# Patient Record
Sex: FEMALE | Race: BLACK OR AFRICAN AMERICAN | NOT HISPANIC OR LATINO | Employment: UNEMPLOYED | URBAN - METROPOLITAN AREA
[De-identification: names, ages, dates, MRNs, and addresses within clinical notes are randomized per-mention and may not be internally consistent; named-entity substitution may affect disease eponyms.]

---

## 2016-07-18 LAB
EXTERNAL ABO GROUPING: NORMAL
EXTERNAL ANTIBODY SCREEN: NEGATIVE
EXTERNAL HEPATITIS B SURFACE ANTIGEN: NEGATIVE
EXTERNAL HEPATITIS C AB: NEGATIVE
EXTERNAL RH FACTOR: POSITIVE
EXTERNAL SYPHILIS RPR SCREEN: NORMAL
HIV1 AB SPEC QL IA.RAPID: NEGATIVE

## 2016-11-13 LAB — EXTERNAL GTT 1 HOUR: 131

## 2016-12-13 LAB — EXTERNAL GTT 3 HOURS: NORMAL

## 2017-01-06 ENCOUNTER — ROUTINE PRENATAL (OUTPATIENT)
Dept: OBSTETRICS AND GYNECOLOGY | Facility: CLINIC | Age: 27
End: 2017-01-06

## 2017-01-06 VITALS — BODY MASS INDEX: 39.31 KG/M2 | WEIGHT: 229 LBS | DIASTOLIC BLOOD PRESSURE: 70 MMHG | SYSTOLIC BLOOD PRESSURE: 130 MMHG

## 2017-01-06 DIAGNOSIS — Z34.03 ENCOUNTER FOR SUPERVISION OF NORMAL FIRST PREGNANCY IN THIRD TRIMESTER: Primary | ICD-10-CM

## 2017-01-06 PROCEDURE — 99212 OFFICE O/P EST SF 10 MIN: CPT | Performed by: OBSTETRICS & GYNECOLOGY

## 2017-01-06 NOTE — MR AVS SNAPSHOT
Enrique Moscoso   1/6/2017 9:40 AM   Routine Prenatal    Dept Phone:  [phone]   Encounter #:  [id number]    Provider:  Bhavesh Armendariz MD   Department:  [facility] OBSTETRICS AND GYNECOLOGY                Your Full Care Plan              Your Updated Medication List          This list is accurate as of: 1/6/17 10:30 AM.  Always use your most recent med list.                acetaminophen 325 MG tablet   Commonly known as:  TYLENOL       doxylamine 25 MG tablet   Commonly known as:  UNISOM   Take 1 tablet by mouth at night as needed for sleep or nausea.       folic acid 1 MG tablet   Commonly known as:  FOLVITE   Take 1 tablet by mouth daily.       PRENATAL VIT W/ FE BISG-FA PO       promethazine 25 MG tablet   Commonly known as:  PHENERGAN       terconazole 0.4 % vaginal cream   Commonly known as:  TERAZOL 7   Insert 1 applicator into the vagina Every Night.               You Were Diagnosed With        Codes Comments    Encounter for supervision of normal first pregnancy in third trimester    -  Primary ICD-10-CM: Z34.03  ICD-9-CM: V22.0       Instructions     None    Patient Instructions History      Upcoming Appointments     Visit Type Date Time Department    OB FOLLOWUP 1/6/2017  9:40 AM [facility]      [employer] Signup     Our records indicate that your [facility] [employer] account has been deactivated. If you would like to reactivate your account, please email [email or web address] or call [phone] to talk to our [employer] staff.             Other Info from Your Visit           Allergies     No Known Allergies      Reason for Visit     Routine Prenatal Visit no problem      Vital Signs     Blood Pressure Weight Last Menstrual Period Body Mass Index Smoking Status       130/70 229 lb (104 kg) 05/15/2016 (Approximate) 39.31 kg/m2 Never Smoker       Problems and Diagnoses Noted     Prenatal care    -  Primary

## 2017-01-20 ENCOUNTER — ROUTINE PRENATAL (OUTPATIENT)
Dept: OBSTETRICS AND GYNECOLOGY | Facility: CLINIC | Age: 27
End: 2017-01-20

## 2017-01-20 VITALS — SYSTOLIC BLOOD PRESSURE: 120 MMHG | BODY MASS INDEX: 40.68 KG/M2 | DIASTOLIC BLOOD PRESSURE: 80 MMHG | WEIGHT: 237 LBS

## 2017-01-20 DIAGNOSIS — Z34.03 ENCOUNTER FOR SUPERVISION OF NORMAL FIRST PREGNANCY IN THIRD TRIMESTER: Primary | ICD-10-CM

## 2017-01-20 DIAGNOSIS — O36.63X1 LARGE FOR DATES AFFECTING MANAGEMENT OF MOTHER, THIRD TRIMESTER, FETUS 1: ICD-10-CM

## 2017-01-20 PROCEDURE — 99213 OFFICE O/P EST LOW 20 MIN: CPT | Performed by: OBSTETRICS & GYNECOLOGY

## 2017-01-20 NOTE — MR AVS SNAPSHOT
Enrique Moscoso   2017 9:30 AM   Routine Prenatal    Dept Phone:  [phone]   Encounter #:  [id number]    Provider:  Bhavesh Armendariz MD   Department:  [facility] OBSTETRICS AND GYNECOLOGY                Your Full Care Plan              Today's Medication Changes          These changes are accurate as of: 17  9:59 AM.  If you have any questions, ask your nurse or doctor.               Stop taking medication(s)listed here:     doxylamine 25 MG tablet   Commonly known as:  UNISOM           terconazole 0.4 % vaginal cream   Commonly known as:  TERAZOL 7                      Your Updated Medication List          This list is accurate as of: 17  9:59 AM.  Always use your most recent med list.                acetaminophen 325 MG tablet   Commonly known as:  TYLENOL       folic acid 1 MG tablet   Commonly known as:  FOLVITE   Take 1 tablet by mouth daily.       PRENATAL VIT W/ FE BISG-FA PO       promethazine 25 MG tablet   Commonly known as:  PHENERGAN               You Were Diagnosed With        Codes Comments    Encounter for supervision of normal first pregnancy in third trimester    -  Primary ICD-10-CM: Z34.03  ICD-9-CM: V22.0     Large for dates affecting management of mother, third trimester, fetus 1     ICD-10-CM: O36.63X1  ICD-9-CM: 656.63       Instructions     None    Patient Instructions History      Upcoming Appointments     Visit Type Date Time Department    OB FOLLOWUP 2017  9:30 AM [facility] [place]      [employer] Signup     [facility] [employer] allows you to send messages to your doctor, view your test results, renew your prescriptions, schedule appointments, and more. To sign up, go to [employer] and click on the Sign Up Now link in the New User? box. Enter your [employer] Activation Code exactly as it appears below along with the last four digits of your Social Security Number and your Date of Birth () to complete the sign-up  process. If you do not sign up before the expiration date, you must request a new code.    [employer] Activation Code: [email or web address]  Expires: 2/3/2017  9:59 AM    If you have questions, you can email [email or web address] or call [phone] to talk to our [employer] staff. Remember, [employer] is NOT to be used for urgent needs. For medical emergencies, dial 911.               Other Info from Your Visit           Allergies     No Known Allergies      Reason for Visit     Routine Prenatal Visit pelvic pressure      Vital Signs     Blood Pressure Weight Last Menstrual Period Body Mass Index Smoking Status       120/80 237 lb (108 kg) 05/15/2016 (Approximate) 40.68 kg/m2 Never Smoker       Problems and Diagnoses Noted     Prenatal care    -  Primary    Large for dates affecting management of mother, third trimester, fetus 1

## 2017-01-20 NOTE — PROGRESS NOTES
No results found for: ABORH, LABANTI, ABID    Chief Complaint   Patient presents with   • Routine Prenatal Visit     pelvic pressure       Today Enrique complains of pelvic pressure  See ob flowsheet for physical exam.    Prenatal Assessment  Fetal Heart Rate: 140  Fundal Height (cm): 38 cm  Movement: Present  Presentation: Vertex  Prenatal Vitals  BP: 120/80  Weight: 237 lb (108 kg)  Urine Glucose/Protein  Urine Glucose: Negative  Urine Protein: Negative  Vaginal Drainage  Draining Fluid: No  Edema  LLE Edema: None  RLE Edema: None  Facial Edema: None     Tests done today: none  At the time of the next visit, she will need to have U/S for EFW - at [facility]    Impression:   Encounter Diagnoses   Name Primary?   • Encounter for supervision of normal first pregnancy in third trimester Yes   • Large for dates affecting management of mother, third trimester, fetus 1        Plan: return 1/27/2017    This note was electronically signed.    Bhavesh Armendariz MD

## 2017-01-21 ENCOUNTER — HOSPITAL ENCOUNTER (OUTPATIENT)
Facility: HOSPITAL | Age: 27
Setting detail: OBSERVATION
Discharge: HOME | End: 2017-01-22
Attending: OBSTETRICS & GYNECOLOGY | Admitting: OBSTETRICS & GYNECOLOGY

## 2017-01-21 DIAGNOSIS — O13.3 GESTATIONAL HYPERTENSION, THIRD TRIMESTER: ICD-10-CM

## 2017-01-21 PROBLEM — Z34.90 PREGNANCY: Status: ACTIVE | Noted: 2017-01-21

## 2017-01-21 LAB
ALP SERPL-CCNC: 99 U/L (ref 25–100)
ALT SERPL W P-5'-P-CCNC: 12 U/L (ref 7–40)
AST SERPL-CCNC: 16 U/L (ref 0–33)
BILIRUB SERPL-MCNC: 0.2 MG/DL (ref 0.3–1.2)
CREAT BLD-MCNC: 0.5 MG/DL (ref 0.6–1.3)
DEPRECATED RDW RBC AUTO: 44.6 FL (ref 37–54)
ERYTHROCYTE [DISTWIDTH] IN BLOOD BY AUTOMATED COUNT: 16.2 % (ref 11.3–14.5)
HCT VFR BLD AUTO: 31.1 % (ref 34.5–44)
HGB BLD-MCNC: 10.3 G/DL (ref 11.5–15.5)
HGB F BLD QL KLEIH BETKE: NORMAL
LDH SERPL-CCNC: 142 U/L (ref 120–246)
MCH RBC QN AUTO: 25.1 PG (ref 27–31)
MCHC RBC AUTO-ENTMCNC: 33.1 G/DL (ref 32–36)
MCV RBC AUTO: 75.9 FL (ref 80–99)
PLATELET # BLD AUTO: 305 10*3/MM3 (ref 150–450)
PMV BLD AUTO: 10.1 FL (ref 6–12)
RBC # BLD AUTO: 4.1 10*6/MM3 (ref 3.89–5.14)
URATE SERPL-MCNC: 4.2 MG/DL (ref 3.1–7.8)
WBC NRBC COR # BLD: 7.89 10*3/MM3 (ref 3.5–10.8)

## 2017-01-21 PROCEDURE — 84550 ASSAY OF BLOOD/URIC ACID: CPT | Performed by: OBSTETRICS & GYNECOLOGY

## 2017-01-21 PROCEDURE — 82565 ASSAY OF CREATININE: CPT | Performed by: OBSTETRICS & GYNECOLOGY

## 2017-01-21 PROCEDURE — 85027 COMPLETE CBC AUTOMATED: CPT | Performed by: OBSTETRICS & GYNECOLOGY

## 2017-01-21 PROCEDURE — 84450 TRANSFERASE (AST) (SGOT): CPT | Performed by: OBSTETRICS & GYNECOLOGY

## 2017-01-21 PROCEDURE — 83615 LACTATE (LD) (LDH) ENZYME: CPT | Performed by: OBSTETRICS & GYNECOLOGY

## 2017-01-21 PROCEDURE — G0378 HOSPITAL OBSERVATION PER HR: HCPCS

## 2017-01-21 PROCEDURE — 59025 FETAL NON-STRESS TEST: CPT

## 2017-01-21 PROCEDURE — 84075 ASSAY ALKALINE PHOSPHATASE: CPT | Performed by: OBSTETRICS & GYNECOLOGY

## 2017-01-21 PROCEDURE — 84460 ALANINE AMINO (ALT) (SGPT): CPT | Performed by: OBSTETRICS & GYNECOLOGY

## 2017-01-21 PROCEDURE — 82247 BILIRUBIN TOTAL: CPT | Performed by: OBSTETRICS & GYNECOLOGY

## 2017-01-21 PROCEDURE — 59025 FETAL NON-STRESS TEST: CPT | Performed by: OBSTETRICS & GYNECOLOGY

## 2017-01-21 PROCEDURE — 99213 OFFICE O/P EST LOW 20 MIN: CPT | Performed by: OBSTETRICS & GYNECOLOGY

## 2017-01-21 PROCEDURE — 85460 HEMOGLOBIN FETAL: CPT | Performed by: OBSTETRICS & GYNECOLOGY

## 2017-01-21 RX ORDER — FERROUS SULFATE 325(65) MG
325 TABLET ORAL 2 TIMES DAILY WITH MEALS
Status: DISCONTINUED | OUTPATIENT
Start: 2017-01-21 | End: 2017-01-22 | Stop reason: HOSPADM

## 2017-01-21 RX ADMIN — FERROUS SULFATE TAB 325 MG (65 MG ELEMENTAL FE) 325 MG: 325 (65 FE) TAB at 17:56
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
## 2017-01-21 NOTE — IP AVS SNAPSHOT
AFTER VISIT SUMMARY             Enrique Lewis           About your hospitalization     You were discharged on:  January 22, 2017 You last received care in the:  [facility] LABOR DELIVERY     Unit phone number:  [phone]       Medications    If you or your caregiver advised us that you are currently taking a medication and that medication is marked below as “Resume”, this simply indicates that we have reviewed those medications to make sure our new therapy recommendations do not interfere.  If you have concerns about medications other than those new ones which we are prescribing today, please consult the physician who prescribed them (or your primary physician).  Our review of your home medications is not meant to indicate that we are directing their use.             Your Medications      Your Medication List           Morning Noon Evening Bedtime As Needed    acetaminophen 325 MG tablet   Take 500 mg by mouth.   Commonly known as:  TYLENOL                                folic acid 1 MG tablet   Take 1 tablet by mouth daily.   Commonly known as:  FOLVITE                                PRENATAL VIT W/ FE BISG-FA PO   Take 1 tablet by mouth daily.                                promethazine 25 MG tablet   take 1/2 to 1 tablet by mouth every 6 hours if needed for nausea   Commonly known as:  PHENERGAN                                         Instructions for After Discharge        Scheduled Appointments     Jan 27, 2017 10:45 AM EST   Follow Up with  ISHAN PDC DEPT SCHEDULE   [facility] MEDICAL [facility] (--)    [address] [place], [place] [id number] [phone]           Arrive 15 minutes prior to appointment.            Jan 27, 2017 11:00 AM EST    ishan pdc with ISHAN PDC  1   [facility] ([place])    [address] [facility] [address] [phone]           Patient is to be hydrated            Jan 27, 2017 11:40 AM EST   OB FOLLOWUP with Bhavesh BLACK  MD Marcello   [facility] (--)    [address]  [facility] [address] [phone]              Activity Instructions     Activity as Tolerated               Additional Activity Instructions:      Call office tomorrow before 8am to schedule same day appointment. Please bring 24 hr urine to the office tomorrow.               Diet Instructions     Advance Diet As Tolerated                  Relevant Prenatal Information          Facts About Your Prenatal Visit (All Dating Information Is Approximate)     Due Date How Far Along Am I? Pregnancy Weight Gain Weight Gain Since Prior Visit (2017)    2017 35 weeks 6 days 25 lb (11.3 kg) 0 lb (0 kg)      Vitals     Blood Pressure Weight                121/72 237 lb (107.5 kg)           Summary of Your Hospitalization        Reason for Hospitalization     Your primary diagnosis was:  Not on File    Your diagnoses also included:  Pregnancy      Care Providers     Provider Service Role Specialty    Bhavesh Armendariz MD -- Attending Provider Obstetrics and Gynecology      Your Allergies  Date Reviewed: 2017    No active allergies      iDubba Signup     [employer] allows you to send messages to your doctor, view your test results, renew your prescriptions, schedule appointments, and more. To sign up, go to [employer] and click on the Sign Up Now link in the New User? box. Enter your iDubba Activation Code exactly as it appears below along with the last four digits of your Social Security Number and your Date of Birth () to complete the sign-up process. If you do not sign up before the expiration date, you must request a new code.    iDubba Activation Code: [email or web address]  Expires: 2/3/2017  9:59 AM    If you have questions, you can email [email or web address] or call [phone] to talk to our iDubba staff. Remember, iDubba is NOT to be used for urgent needs.  For medical emergencies, dial 911.          Information Regarding Fetal Kick Counts     Fetal Movement Counts  Patient Name: __________________________________________________   Patient Due Date: ____________________  Performing a fetal movement count is highly recommended in high-risk pregnancies, but it is good for every pregnant woman to do. Your caregiver may ask you to start counting fetal movements at 28 weeks of the pregnancy. Fetal movements often increase:  · After eating a full meal.  · After physical activity.  · After eating or drinking something sweet or cold.  · At rest.  Pay attention to when you feel the baby is most active. This will help you notice a pattern of your baby's sleep and wake cycles and what factors contribute to an increase in fetal movement. It is important to perform a fetal movement count at the same time each day when your baby is normally most active.    HOW TO COUNT FETAL MOVEMENTS  1. Find a quiet and comfortable area to sit or lie down on your left side. Lying on your left side provides the best blood and oxygen circulation to your baby.  2. Write down the day and time on a sheet of paper or in a journal.  3. Start counting kicks, flutters, swishes, rolls, or jabs in a 2 hour period. You should feel at least 10 movements within 2 hours.  4. If you do not feel 10 movements in 2 hours, wait 2-3 hours and count again. Look for a change in the pattern or not enough counts in 2 hours.  SEEK MEDICAL CARE IF:  · You feel less than 10 counts in 2 hours, tried twice.  · There is no movement in over an hour.  · The pattern is changing or taking longer each day to reach 10 counts in 2 hours.  · You feel the baby is not moving as he or she usually does.    Date  Movements  Start Time  Finish Time    Date  Movements  Start Time  Finish Time    Date  Movements  Start Time  Finish Time    Date  Movements  Start Time  Finish Time    Date  Movements  Start Time  Finish Time    Date  Movements   Start Time  Finish Time      Date  Movements  Start Time  Finish Time    Date  Movements  Start Time  Finish Time    Date  Movements  Start Time  Finish Time    Date  Movements  Start Time  Finish Time    Date  Movements  Start Time  Finish Time    Date  Movements  Start Time  Finish Time      Date  Movements  Start Time  Finish Time    Date  Movements  Start Time  Finish Time    Date  Movements  Start Time  Finish Time    Date  Movements  Start Time  Finish Time    Date  Movements  Start Time  Finish Time    Date  Movements  Start Time  Finish Time    Date  Movements  Start Time  Finish Time    Date  Movements  Start Time  Finish Time    Date  Movements  Start Time  Finish Time    Date  Movements  Start Time  Finish Time    Date  Movements  Start Time  Finish Time    Date  Movements  Start Time  Finish Time      Document Released: 01/17/2008   Document Revised: 12/04/2013   Document Reviewed: 10/14/2013    ExitCare® Patient Information ©2015 [employer]. This information is not intended to replace advice given to you by your health care provider. Make sure you discuss any questions you have with your health care provider.            More Information      Hypertension During Pregnancy  Hypertension is also called high blood pressure. Blood pressure moves blood in your body. Sometimes, the force that moves the blood becomes too strong. When you are pregnant, this condition should be watched carefully. It can cause problems for you and your baby.  HOME CARE   · Make and keep all of your doctor visits.  · Take medicine as told by your doctor. Tell your doctor about all medicines you take.  · Eat very little salt.  · Exercise regularly.  · Do not drink alcohol.  · Do not smoke.  · Do not have drinks with caffeine.  · Lie on your left side when resting.  · Your health care provider may ask you to take one low-dose aspirin (81mg) each day.  GET HELP RIGHT AWAY IF:  · You have bad belly (abdominal) pain.  · You have  sudden puffiness (swelling) in the hands, ankles, or face.  · You gain 4 pounds (1.8 kilograms) or more in 1 week.  · You throw up (vomit) repeatedly.  · You have bleeding from the vagina.  · You do not feel the baby moving as much.  · You have a headache.  · You have blurred or double vision.  · You have muscle twitching or spasms.  · You have shortness of breath.  · You have blue fingernails and lips.  · You have blood in your pee (urine).  MAKE SURE YOU:  · Understand these instructions.  · Will watch your condition.  · Will get help right away if you are not doing well or get worse.     This information is not intended to replace advice given to you by your health care provider. Make sure you discuss any questions you have with your health care provider.     Document Released: 01/20/2012 Document Revised: 01/08/2016 Document Reviewed: 07/17/2014  [employer] Patient Education ©2016 Elsevier Inc.          Third Trimester of Pregnancy  The third trimester is from week 29 through week 42, months 7 through 9. This trimester is when your unborn baby (fetus) is growing very fast. At the end of the ninth month, the unborn baby is about 20 inches in length. It weighs about 6-10 pounds.   HOME CARE   · Avoid all smoking, herbs, and alcohol. Avoid drugs not approved by your doctor.  · Do not use any tobacco products, including cigarettes, chewing tobacco, and electronic cigarettes. If you need help quitting, ask your doctor. You may get counseling or other support to help you quit.  · Only take medicine as told by your doctor. Some medicines are safe and some are not during pregnancy.  · Exercise only as told by your doctor. Stop exercising if you start having cramps.  · Eat regular, healthy meals.  · Wear a good support bra if your breasts are tender.  · Do not use hot tubs, steam rooms, or saunas.  · Wear your seat belt when driving.  · Avoid raw meat, uncooked cheese, and liter boxes and soil used by  cats.  · Take your prenatal vitamins.  · Take 2571-4550 milligrams of calcium daily starting at the 20th week of pregnancy until you deliver your baby.  · Try taking medicine that helps you poop (stool softener) as needed, and if your doctor approves. Eat more fiber by eating fresh fruit, vegetables, and whole grains. Drink enough fluids to keep your pee (urine) clear or pale yellow.  · Take warm water baths (sitz baths) to soothe pain or discomfort caused by hemorrhoids. Use hemorrhoid cream if your doctor approves.  · If you have puffy, bulging veins (varicose veins), wear support hose. Raise (elevate) your feet for 15 minutes, 3-4 times a day. Limit salt in your diet.  · Avoid heavy lifting, wear low heels, and sit up straight.  · Rest with your legs raised if you have leg cramps or low back pain.  · Visit your dentist if you have not gone during your pregnancy. Use a soft toothbrush to brush your teeth. Be gentle when you floss.  · You can have sex (intercourse) unless your doctor tells you not to.  · Do not travel far distances unless you must. Only do so with your doctor's approval.  · Take prenatal classes.  · Practice driving to the hospital.  · Pack your hospital bag.  · Prepare the baby's room.  · Go to your doctor visits.  GET HELP IF:  · You are not sure if you are in labor or if your water has broken.  · You are dizzy.  · You have mild cramps or pressure in your lower belly (abdominal).  · You have a nagging pain in your belly area.  · You continue to feel sick to your stomach (nauseous), throw up (vomit), or have watery poop (diarrhea).  · You have bad smelling fluid coming from your vagina.  · You have pain with peeing (urination).  GET HELP RIGHT AWAY IF:   · You have a fever.  · You are leaking fluid from your vagina.  · You are spotting or bleeding from your vagina.  · You have severe belly cramping or pain.  · You lose or gain weight rapidly.  · You have trouble catching your breath and have chest  pain.  · You notice sudden or extreme puffiness (swelling) of your face, hands, ankles, feet, or legs.  · You have not felt the baby move in over an hour.  · You have severe headaches that do not go away with medicine.  · You have vision changes.     This information is not intended to replace advice given to you by your health care provider. Make sure you discuss any questions you have with your health care provider.     Document Released: 03/14/2011 Document Revised: 01/08/2016 Document Reviewed: 02/18/2014  [employer] Patient Education ©2016 Elsevier Inc.          24-Hour Urine Collection  HOW DO I DO A 24-HOUR URINE COLLECTION?  · When you get up in the morning, urinate in the toilet and flush. Write down the time. This will be your start time on the day of collection and your end time on the next morning.  · From then on, collect all of your urine in the plastic jug that is given to you.  · Stop collecting your urine 24 hours after you started.  · If the plastic jug that is given to you already has liquid in it, that is okay. Do not throw out the liquid or rinse out the jug. Some tests need the liquid to be added to your urine.  · Keep your plastic jug cool in an ice chest or keep it in the refrigerator during the test.  · When 24 hours are over, bring your plastic jug to the clinic lab. Keep the jug cool in an ice chest while you are bringing it to the lab.     This information is not intended to replace advice given to you by your health care provider. Make sure you discuss any questions you have with your health care provider.     Document Released: 03/16/2010 Document Revised: 01/08/2016 Document Reviewed: 05/13/2015  [employer] Patient Education ©2016 Elsevier Inc.            SYMPTOMS OF A STROKE    Call 911 or have someone take you to the Emergency Department if you have any of the following:    · Sudden numbness or weakness of your face, arm or leg especially on one side of the  body  · Sudden confusion, diffiiculty speaking or trouble understanding   · Changes in your vision or loss of sight in one eye  · Sudden severe headache with no known cause  · sudden dizziness, trouble walking, loss of balance or coordination    It is important to seek emergency care right away if you have further stroke symptoms. If you get emergency help quickly, the powerful clot-dissolving medicines can reduce the disabilities caused by a stroke.     For more information:    American Stroke Association  7-199-3-STROKE  www.strokeassociation.org           IF YOU SMOKE OR USE TOBACCO PLEASE READ THE FOLLOWING:    Why is smoking bad for me?  Smoking increases the risk of heart disease, lung disease, vascular disease, stroke, and cancer.     If you smoke, STOP!    If you would like more information on quitting smoking, please visit the [employer] website: [email or web address]   This link will provide additional resources including the QUIT line and the Beat the Pack support groups.     For more information:    American Cancer Society  ([phone] 575-5649    American Heart Association  1-766.475.8522               YOU ARE THE MOST IMPORTANT FACTOR IN YOUR RECOVERY.     Follow all instructions carefully.     I have reviewed my discharge instructions with my nurse, including the following information, if applicable:     Information about my illness and diagnosis   Follow up appointments (including lab draws)   Wound Care   Equipment Needs   Medications (new and continuing) along with side effects   Preventative information such as vaccines and smoking cessations   Diet   Pain   I know when to contact my Doctor's office or seek emergency care      I want my nurse to describe the side effects of my medications: YES NO   If the answer is no, I understand the side effects of my medications: YES NO   My nurse described the side effects of my medications in a way that I could  understand: YES NO   I have taken my personal belongings and my own medications with me at discharge: YES NO            Should a home visit be schedule with you:  a notification from your provider will be made prior to the visit.  If you have any questions or concerns about the visit, contact your provider.      I have received this information and my questions have been answered. I have discussed any concerns I see with this plan with the nurse or physician. I understand these instructions.    Signature of Patient or Responsible Person: _____________________________________    Date: _________________  Time: __________________    Signature of Healthcare Provider: _______________________________________  Date: _________________  Time: __________________          Additional Information     I have reviewed the patient condition and status with the provider and a discharge order was obtained.  I hereby state that the patient has been examined and observed for a reasonable period of time and I certify that the patient is in false labor.    RN Name ___________________________________________    Date and Time _______________________________________

## 2017-01-22 VITALS
TEMPERATURE: 98.1 F | HEART RATE: 87 BPM | SYSTOLIC BLOOD PRESSURE: 121 MMHG | RESPIRATION RATE: 16 BRPM | DIASTOLIC BLOOD PRESSURE: 72 MMHG | BODY MASS INDEX: 39.49 KG/M2 | HEIGHT: 65 IN | WEIGHT: 237 LBS

## 2017-01-22 LAB
ALP SERPL-CCNC: 99 U/L (ref 25–100)
ALT SERPL W P-5'-P-CCNC: 6 U/L (ref 7–40)
AST SERPL-CCNC: 11 U/L (ref 0–33)
BILIRUB SERPL-MCNC: 0.2 MG/DL (ref 0.3–1.2)
CREAT BLD-MCNC: 0.4 MG/DL (ref 0.6–1.3)
DEPRECATED RDW RBC AUTO: 44.7 FL (ref 37–54)
ERYTHROCYTE [DISTWIDTH] IN BLOOD BY AUTOMATED COUNT: 16.2 % (ref 11.3–14.5)
HCT VFR BLD AUTO: 32.8 % (ref 34.5–44)
HGB BLD-MCNC: 10.7 G/DL (ref 11.5–15.5)
LDH SERPL-CCNC: 123 U/L (ref 120–246)
MCH RBC QN AUTO: 24.8 PG (ref 27–31)
MCHC RBC AUTO-ENTMCNC: 32.6 G/DL (ref 32–36)
MCV RBC AUTO: 75.9 FL (ref 80–99)
PLATELET # BLD AUTO: 314 10*3/MM3 (ref 150–450)
PMV BLD AUTO: 10.3 FL (ref 6–12)
RBC # BLD AUTO: 4.32 10*6/MM3 (ref 3.89–5.14)
URATE SERPL-MCNC: 4.1 MG/DL (ref 3.1–7.8)
WBC NRBC COR # BLD: 8.74 10*3/MM3 (ref 3.5–10.8)

## 2017-01-22 PROCEDURE — 83615 LACTATE (LD) (LDH) ENZYME: CPT | Performed by: OBSTETRICS & GYNECOLOGY

## 2017-01-22 PROCEDURE — G0378 HOSPITAL OBSERVATION PER HR: HCPCS

## 2017-01-22 PROCEDURE — 99217 PR OBSERVATION CARE DISCHARGE MANAGEMENT: CPT | Performed by: OBSTETRICS & GYNECOLOGY

## 2017-01-22 PROCEDURE — 84075 ASSAY ALKALINE PHOSPHATASE: CPT | Performed by: OBSTETRICS & GYNECOLOGY

## 2017-01-22 PROCEDURE — 85027 COMPLETE CBC AUTOMATED: CPT | Performed by: OBSTETRICS & GYNECOLOGY

## 2017-01-22 PROCEDURE — 82247 BILIRUBIN TOTAL: CPT | Performed by: OBSTETRICS & GYNECOLOGY

## 2017-01-22 PROCEDURE — 84460 ALANINE AMINO (ALT) (SGPT): CPT | Performed by: OBSTETRICS & GYNECOLOGY

## 2017-01-22 PROCEDURE — 84450 TRANSFERASE (AST) (SGOT): CPT | Performed by: OBSTETRICS & GYNECOLOGY

## 2017-01-22 PROCEDURE — G0463 HOSPITAL OUTPT CLINIC VISIT: HCPCS

## 2017-01-22 PROCEDURE — 59025 FETAL NON-STRESS TEST: CPT

## 2017-01-22 PROCEDURE — 82565 ASSAY OF CREATININE: CPT | Performed by: OBSTETRICS & GYNECOLOGY

## 2017-01-22 PROCEDURE — 84550 ASSAY OF BLOOD/URIC ACID: CPT | Performed by: OBSTETRICS & GYNECOLOGY

## 2017-01-22 RX ADMIN — FERROUS SULFATE TAB 325 MG (65 MG ELEMENTAL FE) 325 MG: 325 (65 FE) TAB at 08:46

## 2017-01-22 NOTE — DISCHARGE INSTR - LAB
Call office tomorrow before 8am to schedule same day appointment. Please bring 24 hr urine to the office tomorrow.

## 2017-01-22 NOTE — PLAN OF CARE
Problem: Patient Care Overview (Adult)  Goal: Plan of Care Review  Outcome: Ongoing (interventions implemented as appropriate)    01/21/17 2012   Coping/Psychosocial Response Interventions   Plan Of Care Reviewed With patient   Patient Care Overview   Progress improving

## 2017-01-22 NOTE — PROGRESS NOTES
[facility]  Obstetric Progress Note    Subjective     Patient:    Resting without complaints    Objective     Vital Signs Range for the last 24 hours  Temp:  [98.1 °F (36.7 °C)-98.4 °F (36.9 °C)] 98.1 °F (36.7 °C)   Temp src: Oral   BP: (105-155)/(61-99) 121/72   Heart Rate:  [70-87] 87   Resp:  [16-18] 16               Weight:  [237 lb (108 kg)] 237 lb (108 kg)       Intake/Output this shift:         Physical Exam:      Abdomen Abdominal exam: soft, nontender, nondistended, no masses or organomegaly.   Extremities Exam of extremities: peripheral pulses normal, no pedal edema, no clubbing or cyanosis     Presentation:    Cervix: Exam by:     Dilation:     Effacement:     Station:           Fetal Heart Rate Assessment   Method: Fetal HR Assessment Method: external   Beats/min: Fetal HR (Beats/Min): 125   Baseline: Fetal HR Baseline: normal range (110-160 bpm)   Varibility: Fetal HR Variability: moderate (amplitude range 6 to 25 bpm)   Accels: Fetal HR Accelerations: greater than/equal to 15 bpm, lasting at least 15 seconds   Decels: Fetal HR Decelerations: absent   Tracing Category:       Uterine Assessment   Method: Method: TOCO (external toco transducer)   Frequency (min): Contraction Frequency (min): x1   Ctx Count in 10 min:     Duration: Contraction Duration (sec):    Intensity: Contraction Intensity: mild by palpation   Intensity by IUPC:     Resting Tone: Uterine Resting Tone: soft by palpation   Resting Tone by IUPC:     [place] Units:         Assessment/Plan     Active Problems:    Pregnancy        Assessment:  1.  Intrauterine pregnancy at 36w0d weeks gestation with reactive fetal status.      .      Plan:  1. Continue observation  2.  Repeat SVE every 2-4 hours or prn  3.   Plan of care has been reviewed with patient and  4.  Risks, benefits of treatment plan have been discussed.  5.  All questions have been answered.  6.       Fab Albert DO  1/22/2017  9:38 AM
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
## 2017-01-22 NOTE — NURSING NOTE
Discharge instructions reviewed with patient. Patient educated to call office in the morning to obtain a same day appt and to bring her 24 hour urine collection to this appt. Patient verbalized understanding and denied further needs or concerns. Patient ambulated to private vehicle.

## 2017-01-23 ENCOUNTER — OFFICE VISIT (OUTPATIENT)
Dept: OBSTETRICS AND GYNECOLOGY | Facility: HOSPITAL | Age: 27
End: 2017-01-23

## 2017-01-23 ENCOUNTER — HOSPITAL ENCOUNTER (OUTPATIENT)
Dept: WOMENS IMAGING | Facility: HOSPITAL | Age: 27
Discharge: HOME | End: 2017-01-23
Attending: OBSTETRICS & GYNECOLOGY | Admitting: OBSTETRICS & GYNECOLOGY

## 2017-01-23 ENCOUNTER — ROUTINE PRENATAL (OUTPATIENT)
Dept: OBSTETRICS AND GYNECOLOGY | Facility: CLINIC | Age: 27
End: 2017-01-23

## 2017-01-23 VITALS — WEIGHT: 239 LBS | BODY MASS INDEX: 39.77 KG/M2 | SYSTOLIC BLOOD PRESSURE: 130 MMHG | DIASTOLIC BLOOD PRESSURE: 90 MMHG

## 2017-01-23 VITALS — BODY MASS INDEX: 39.27 KG/M2 | WEIGHT: 236 LBS | SYSTOLIC BLOOD PRESSURE: 136 MMHG | DIASTOLIC BLOOD PRESSURE: 88 MMHG

## 2017-01-23 DIAGNOSIS — Z34.03 ENCOUNTER FOR SUPERVISION OF NORMAL FIRST PREGNANCY IN THIRD TRIMESTER: ICD-10-CM

## 2017-01-23 DIAGNOSIS — Z34.90 PREGNANCY, UNSPECIFIED GESTATIONAL AGE: ICD-10-CM

## 2017-01-23 DIAGNOSIS — E66.01 MORBID OBESITY DUE TO EXCESS CALORIES (HCC): Primary | ICD-10-CM

## 2017-01-23 DIAGNOSIS — O13.3 GESTATIONAL HYPERTENSION, THIRD TRIMESTER: Primary | ICD-10-CM

## 2017-01-23 DIAGNOSIS — O09.299 CURRENT SINGLETON PREGNANCY WITH HISTORY OF CONGENITAL HEART DISEASE IN PRIOR CHILD, ANTEPARTUM: ICD-10-CM

## 2017-01-23 PROCEDURE — 99213 OFFICE O/P EST LOW 20 MIN: CPT | Performed by: OBSTETRICS & GYNECOLOGY

## 2017-01-23 PROCEDURE — 81050 URINALYSIS VOLUME MEASURE: CPT | Performed by: OBSTETRICS & GYNECOLOGY

## 2017-01-23 PROCEDURE — 82575 CREATININE CLEARANCE TEST: CPT | Performed by: OBSTETRICS & GYNECOLOGY

## 2017-01-23 PROCEDURE — 76816 OB US FOLLOW-UP PER FETUS: CPT

## 2017-01-23 PROCEDURE — 84156 ASSAY OF PROTEIN URINE: CPT | Performed by: OBSTETRICS & GYNECOLOGY

## 2017-01-23 PROCEDURE — 76816 OB US FOLLOW-UP PER FETUS: CPT | Performed by: OBSTETRICS & GYNECOLOGY
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
## 2017-01-23 NOTE — MR AVS SNAPSHOT
Enrique Moscoso   2017 12:45 PM   Office Visit    Dept Phone:  [phone]   Encounter #:  [id number]    Provider:  Jeremi Lynn MD   Department:  [facility]                Your Full Care Plan              Your Updated Medication List          This list is accurate as of: 17  2:18 PM.  Always use your most recent med list.                acetaminophen 325 MG tablet   Commonly known as:  TYLENOL       folic acid 1 MG tablet   Commonly known as:  FOLVITE   Take 1 tablet by mouth daily.       PRENATAL VIT W/ FE BISG-FA PO       promethazine 25 MG tablet   Commonly known as:  PHENERGAN               You Were Diagnosed With        Codes Comments    Morbid obesity due to excess calories    -  Primary ICD-10-CM: E66.01  ICD-9-CM: 278.01     Current finch pregnancy with history of congenital heart disease in prior child, antepartum     ICD-10-CM: O09.899  ICD-9-CM: V23.89     Pregnancy, unspecified gestational age     ICD-10-CM: Z33.1  ICD-9-CM: V22.2       Instructions     None    Patient Instructions History      Upcoming Appointments     Visit Type Date Time Department    OB FOLLOWUP 2017 10:50 AM [facility] [place]    [facility] 2017  1:00 PM  [facility]    FOLLOW UP 2017 12:45 PM [facility] [place]    OB FOLLOWUP 2017 11:40 AM [facility] [place]      MyChart Signup     [facility] [employer] allows you to send messages to your doctor, view your test results, renew your prescriptions, schedule appointments, and more. To sign up, go to [employer] and click on the Sign Up Now link in the New User? box. Enter your [employer] Activation Code exactly as it appears below along with the last four digits of your Social Security Number and your Date of Birth () to complete the sign-up process. If you do not sign up before the expiration date, you must request a new code.    [employer] Activation Code: [email or web address]  Expires:  2/3/2017  9:59 AM    If you have questions, you can email [email or web address] or call [phone] to talk to our MyChart staff. Remember, [employer] is NOT to be used for urgent needs. For medical emergencies, dial 911.               Other Info from Your Visit           Your Appointments     Jan 27, 2017 11:40 AM EST   OB FOLLOWUP with Bhavesh Armendariz MD   [facility] OBSTETRICS AND GYNECOLOGY (--)    [address] [place] [address] [address] [place] [id number] [phone]              Allergies     No Known Allergies      Reason for Visit     High Risk Gestation S>D, GHTN      Vital Signs     Blood Pressure Weight Last Menstrual Period Body Mass Index Smoking Status       136/88 236 lb (107 kg) 05/15/2016 (Approximate) 39.27 kg/m2 Never Smoker       Problems and Diagnoses Noted     Current finch pregnancy with history of congenital heart disease in prior child, antepartum    Severe obesity    Pregnancy

## 2017-01-23 NOTE — PROGRESS NOTES
Documentation of the ultrasound findings, images, and interpretations will be available in the patient's ViewPoint report located in the chart review imaging tab in Epic. Documentation of the ultrasound findings, images, and interpretations will be available in the patient's ViewPoint report located in the chart review imaging tab in [facility].

## 2017-01-23 NOTE — PROGRESS NOTES
Saw Dr. Armendariz today. See his note.  No meds for BP. Pt states she's had a mild HA for the past 3 days.
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
## 2017-01-24 LAB
COLLECT DURATION TIME UR: 24 HRS
COLLECT DURATION TIME UR: 24 HRS
CREAT CL 24H UR+SERPL-VRATE: 161.1 ML/MIN (ref 100–180)
CREAT CL 24H UR+SERPL-VRATE: 232 L/24 HR (ref 144–259.2)
CREAT UR-MCNC: 75.8 MG/DL
CREATINE 24H UR-MRATE: 1.14 G/24 HR (ref 0.6–1.8)
PROT 24H UR-MRATE: 135 MG/24HOURS (ref 42–225)
PROT UR-MCNC: 9 MG/DL (ref 1–14)
SPECIMEN VOL 24H UR: 1500 ML

## 2017-01-27 ENCOUNTER — APPOINTMENT (OUTPATIENT)
Dept: WOMENS IMAGING | Facility: HOSPITAL | Age: 27
End: 2017-01-27
Attending: OBSTETRICS & GYNECOLOGY

## 2017-01-27 ENCOUNTER — ROUTINE PRENATAL (OUTPATIENT)
Dept: OBSTETRICS AND GYNECOLOGY | Facility: CLINIC | Age: 27
End: 2017-01-27

## 2017-01-27 VITALS — BODY MASS INDEX: 39.94 KG/M2 | WEIGHT: 240 LBS | DIASTOLIC BLOOD PRESSURE: 80 MMHG | SYSTOLIC BLOOD PRESSURE: 134 MMHG

## 2017-01-27 DIAGNOSIS — Z34.03 ENCOUNTER FOR SUPERVISION OF NORMAL FIRST PREGNANCY IN THIRD TRIMESTER: ICD-10-CM

## 2017-01-27 DIAGNOSIS — O13.3 GESTATIONAL HYPERTENSION, THIRD TRIMESTER: Primary | ICD-10-CM

## 2017-01-27 PROCEDURE — 99213 OFFICE O/P EST LOW 20 MIN: CPT | Performed by: OBSTETRICS & GYNECOLOGY

## 2017-01-27 NOTE — PROGRESS NOTES
No results found for: ABORH, LABANTI, ABID    Chief Complaint   Patient presents with   • Routine Prenatal Visit     Pt has no complaints.        Today Enrique has no specific complaints  See ob flowsheet for physical exam.    Prenatal Assessment  Fetal Heart Rate: 136  Fundal Height (cm): 38 cm  Movement: Present  Presentation: Vertex  Prenatal Vitals  BP: 134/80  Weight: 240 lb (109 kg)  Urine Glucose/Protein  Urine Glucose: Negative  Urine Protein: Trace  Vaginal Drainage  Draining Fluid: No  Edema  LLE Edema: None  RLE Edema: None  Facial Edema: None     Tests done today: none  At the time of the next visit, she will need to have none    Impression:   Encounter Diagnoses   Name Primary?   • Gestational hypertension, third trimester Yes   • Encounter for supervision of normal first pregnancy in third trimester        Plan: return 1 week    This note was electronically signed.    Bhavesh Armendariz MD

## 2017-01-27 NOTE — MR AVS SNAPSHOT
Enrique Moscoso   2017 11:40 AM   Routine Prenatal    Dept Phone:  [phone]   Encounter #:  [id number]    Provider:  Bhavesh Armendariz MD   Department:  [facility] OBSTETRICS AND GYNECOLOGY                Your Full Care Plan              Your Updated Medication List          This list is accurate as of: 17 12:27 PM.  Always use your most recent med list.                acetaminophen 325 MG tablet   Commonly known as:  TYLENOL       folic acid 1 MG tablet   Commonly known as:  FOLVITE   Take 1 tablet by mouth daily.       PRENATAL VIT W/ FE BISG-FA PO       promethazine 25 MG tablet   Commonly known as:  PHENERGAN               You Were Diagnosed With        Codes Comments    Gestational hypertension, third trimester    -  Primary ICD-10-CM: O13.3  ICD-9-CM: 642.33     Encounter for supervision of normal first pregnancy in third trimester     ICD-10-CM: Z34.03  ICD-9-CM: V22.0       Instructions     None    Patient Instructions History      Upcoming Appointments     Visit Type Date Time Department    OB FOLLOWUP 2017 11:40 AM [facility] [place]      peer[employer] Signup     [facility] [employer] allows you to send messages to your doctor, view your test results, renew your prescriptions, schedule appointments, and more. To sign up, go to [employer] and click on the Sign Up Now link in the New User? box. Enter your [employer] Activation Code exactly as it appears below along with the last four digits of your Social Security Number and your Date of Birth () to complete the sign-up process. If you do not sign up before the expiration date, you must request a new code.    [employer] Activation Code: [email or web address]  Expires: 2/3/2017  9:59 AM    If you have questions, you can email [email or web address] or call [phone] to talk to our [employer] staff. Remember, [employer] is NOT to be used for urgent needs. For medical emergencies, dial 911.               Other Info from Your Visit           Allergies     No Known Allergies      Reason for Visit     Routine Prenatal Visit Pt has no complaints.       Vital Signs     Blood Pressure Weight Last Menstrual Period Body Mass Index Smoking Status       134/80 240 lb (109 kg) 05/15/2016 (Approximate) 39.94 kg/m2 Never Smoker       Problems and Diagnoses Noted     Pregnancy-induced hypertension    -  Primary    Prenatal care          No Longer an Issue     Current finch pregnancy with history of congenital heart disease in prior child, antepartum

## 2017-02-02 ENCOUNTER — ROUTINE PRENATAL (OUTPATIENT)
Dept: OBSTETRICS AND GYNECOLOGY | Facility: CLINIC | Age: 27
End: 2017-02-02

## 2017-02-02 VITALS — SYSTOLIC BLOOD PRESSURE: 126 MMHG | DIASTOLIC BLOOD PRESSURE: 80 MMHG | WEIGHT: 240 LBS | BODY MASS INDEX: 39.94 KG/M2

## 2017-02-02 DIAGNOSIS — Z34.03 ENCOUNTER FOR SUPERVISION OF NORMAL FIRST PREGNANCY IN THIRD TRIMESTER: Primary | ICD-10-CM

## 2017-02-02 PROCEDURE — 99212 OFFICE O/P EST SF 10 MIN: CPT | Performed by: OBSTETRICS & GYNECOLOGY

## 2017-02-02 NOTE — PROGRESS NOTES
No results found for: ABORH, LABANTI, ABID    Chief Complaint   Patient presents with   • Routine Prenatal Visit     Pt. has no complaints this visit.        Today Enrique has no specific complaints  See ob flowsheet for physical exam.    Prenatal Assessment  Fetal Heart Rate: 140   Movement: Present  Prenatal Vitals  BP: 126/80  Weight: 240 lb (109 kg)     Tests done today: none  At the time of the next visit, she will need to have none    Impression:   Encounter Diagnoses   Name Primary?   • Encounter for supervision of normal first pregnancy in third trimester Yes       Plan: return 2/13/17    This note was electronically signed.    Bhavesh Armendariz MD

## 2017-02-10 ENCOUNTER — HOSPITAL ENCOUNTER (INPATIENT)
Facility: HOSPITAL | Age: 27
LOS: 5 days | Discharge: HOME | End: 2017-02-15
Attending: OBSTETRICS & GYNECOLOGY | Admitting: OBSTETRICS & GYNECOLOGY

## 2017-02-10 ENCOUNTER — ANESTHESIA (OUTPATIENT)
Dept: LABOR AND DELIVERY | Facility: HOSPITAL | Age: 27
End: 2017-02-10

## 2017-02-10 ENCOUNTER — ANESTHESIA EVENT (OUTPATIENT)
Dept: LABOR AND DELIVERY | Facility: HOSPITAL | Age: 27
End: 2017-02-10

## 2017-02-10 DIAGNOSIS — D50.9 IRON DEFICIENCY ANEMIA, UNSPECIFIED IRON DEFICIENCY ANEMIA TYPE: ICD-10-CM

## 2017-02-10 DIAGNOSIS — O16.9: ICD-10-CM

## 2017-02-10 DIAGNOSIS — Z3A.38 38 WEEKS GESTATION OF PREGNANCY: Primary | ICD-10-CM

## 2017-02-10 LAB
ABO GROUP BLD: NORMAL
ALP SERPL-CCNC: 118 U/L (ref 25–100)
ALT SERPL W P-5'-P-CCNC: 16 U/L (ref 7–40)
AMPHET+METHAMPHET UR QL: NEGATIVE
AMPHETAMINES UR QL: NEGATIVE
AST SERPL-CCNC: 21 U/L (ref 0–33)
BARBITURATES UR QL SCN: NEGATIVE
BENZODIAZ UR QL SCN: NEGATIVE
BILIRUB SERPL-MCNC: 0.3 MG/DL (ref 0.3–1.2)
BLD GP AB SCN SERPL QL: NEGATIVE
BUPRENORPHINE SERPL-MCNC: NEGATIVE NG/ML
CANNABINOIDS SERPL QL: POSITIVE
COCAINE UR QL: NEGATIVE
CREAT BLD-MCNC: 0.6 MG/DL (ref 0.6–1.3)
DEPRECATED RDW RBC AUTO: 48.8 FL (ref 37–54)
ERYTHROCYTE [DISTWIDTH] IN BLOOD BY AUTOMATED COUNT: 17.7 % (ref 11.3–14.5)
HCT VFR BLD AUTO: 35.4 % (ref 34.5–44)
HGB BLD-MCNC: 11.7 G/DL (ref 11.5–15.5)
LDH SERPL-CCNC: 172 U/L (ref 120–246)
MCH RBC QN AUTO: 25.1 PG (ref 27–31)
MCHC RBC AUTO-ENTMCNC: 33.1 G/DL (ref 32–36)
MCV RBC AUTO: 76 FL (ref 80–99)
METHADONE UR QL SCN: NEGATIVE
OPIATES UR QL: NEGATIVE
OXYCODONE UR QL SCN: NEGATIVE
PCP UR QL SCN: NEGATIVE
PLATELET # BLD AUTO: 370 10*3/MM3 (ref 150–450)
PMV BLD AUTO: 10.8 FL (ref 6–12)
PROPOXYPH UR QL: NEGATIVE
RBC # BLD AUTO: 4.66 10*6/MM3 (ref 3.89–5.14)
RH BLD: POSITIVE
TRICYCLICS UR QL SCN: NEGATIVE
URATE SERPL-MCNC: 4.7 MG/DL (ref 3.1–7.8)
WBC NRBC COR # BLD: 9.11 10*3/MM3 (ref 3.5–10.8)

## 2017-02-10 PROCEDURE — 82247 BILIRUBIN TOTAL: CPT | Performed by: OBSTETRICS & GYNECOLOGY

## 2017-02-10 PROCEDURE — 84075 ASSAY ALKALINE PHOSPHATASE: CPT | Performed by: OBSTETRICS & GYNECOLOGY

## 2017-02-10 PROCEDURE — 86900 BLOOD TYPING SEROLOGIC ABO: CPT

## 2017-02-10 PROCEDURE — 25010000002 FENTANYL CITRATE (PF) 100 MCG/2ML SOLUTION: Performed by: NURSE ANESTHETIST, CERTIFIED REGISTERED

## 2017-02-10 PROCEDURE — 25010000002 ROPIVACAINE PER 1 MG: Performed by: NURSE ANESTHETIST, CERTIFIED REGISTERED

## 2017-02-10 PROCEDURE — 25010000002 BUTORPHANOL PER 1 MG: Performed by: OBSTETRICS & GYNECOLOGY

## 2017-02-10 PROCEDURE — 84460 ALANINE AMINO (ALT) (SGPT): CPT | Performed by: OBSTETRICS & GYNECOLOGY

## 2017-02-10 PROCEDURE — 25010000002 METOCLOPRAMIDE PER 10 MG: Performed by: ANESTHESIOLOGY

## 2017-02-10 PROCEDURE — 85027 COMPLETE CBC AUTOMATED: CPT | Performed by: OBSTETRICS & GYNECOLOGY

## 2017-02-10 PROCEDURE — 59514 CESAREAN DELIVERY ONLY: CPT | Performed by: OBSTETRICS & GYNECOLOGY

## 2017-02-10 PROCEDURE — 86850 RBC ANTIBODY SCREEN: CPT

## 2017-02-10 PROCEDURE — 59025 FETAL NON-STRESS TEST: CPT

## 2017-02-10 PROCEDURE — 25010000003 MORPHINE PER 10 MG: Performed by: ANESTHESIOLOGY

## 2017-02-10 PROCEDURE — 83615 LACTATE (LD) (LDH) ENZYME: CPT | Performed by: OBSTETRICS & GYNECOLOGY

## 2017-02-10 PROCEDURE — 25010000003 CEFAZOLIN IN DEXTROSE 2-4 GM/100ML-% SOLUTION: Performed by: OBSTETRICS & GYNECOLOGY

## 2017-02-10 PROCEDURE — 80306 DRUG TEST PRSMV INSTRMNT: CPT | Performed by: OBSTETRICS & GYNECOLOGY

## 2017-02-10 PROCEDURE — 84550 ASSAY OF BLOOD/URIC ACID: CPT | Performed by: OBSTETRICS & GYNECOLOGY

## 2017-02-10 PROCEDURE — 10907ZC DRAINAGE OF AMNIOTIC FLUID, THERAPEUTIC FROM PRODUCTS OF CONCEPTION, VIA NATURAL OR ARTIFICIAL OPENING: ICD-10-PCS | Performed by: OBSTETRICS & GYNECOLOGY

## 2017-02-10 PROCEDURE — 84450 TRANSFERASE (AST) (SGOT): CPT | Performed by: OBSTETRICS & GYNECOLOGY

## 2017-02-10 PROCEDURE — 25010000002 ONDANSETRON PER 1 MG: Performed by: ANESTHESIOLOGY

## 2017-02-10 PROCEDURE — 25010000002 MIDAZOLAM PER 1 MG: Performed by: ANESTHESIOLOGY

## 2017-02-10 PROCEDURE — 86901 BLOOD TYPING SEROLOGIC RH(D): CPT

## 2017-02-10 PROCEDURE — 82565 ASSAY OF CREATININE: CPT | Performed by: OBSTETRICS & GYNECOLOGY

## 2017-02-10 PROCEDURE — 25010000002 FENTANYL CITRATE (PF) 100 MCG/2ML SOLUTION: Performed by: ANESTHESIOLOGY

## 2017-02-10 PROCEDURE — C1755 CATHETER, INTRASPINAL: HCPCS | Performed by: ANESTHESIOLOGY

## 2017-02-10 PROCEDURE — 25010000002 CHLOROPROCAINE HCL (PF) 3 % SOLUTION: Performed by: ANESTHESIOLOGY

## 2017-02-10 PROCEDURE — 99221 1ST HOSP IP/OBS SF/LOW 40: CPT | Performed by: OBSTETRICS & GYNECOLOGY

## 2017-02-10 RX ORDER — FAMOTIDINE 10 MG/ML
INJECTION, SOLUTION INTRAVENOUS AS NEEDED
Status: DISCONTINUED | OUTPATIENT
Start: 2017-02-10 | End: 2017-02-10 | Stop reason: SURG

## 2017-02-10 RX ORDER — NALOXONE HCL 0.4 MG/ML
0.1 VIAL (ML) INJECTION
Status: DISCONTINUED | OUTPATIENT
Start: 2017-02-10 | End: 2017-02-15 | Stop reason: HOSPADM

## 2017-02-10 RX ORDER — OXYCODONE AND ACETAMINOPHEN 7.5; 325 MG/1; MG/1
2 TABLET ORAL EVERY 4 HOURS PRN
Status: DISCONTINUED | OUTPATIENT
Start: 2017-02-10 | End: 2017-02-10

## 2017-02-10 RX ORDER — ONDANSETRON 2 MG/ML
4 INJECTION INTRAMUSCULAR; INTRAVENOUS ONCE AS NEEDED
Status: DISCONTINUED | OUTPATIENT
Start: 2017-02-10 | End: 2017-02-13

## 2017-02-10 RX ORDER — FENTANYL CITRATE 50 UG/ML
INJECTION, SOLUTION INTRAMUSCULAR; INTRAVENOUS AS NEEDED
Status: DISCONTINUED | OUTPATIENT
Start: 2017-02-10 | End: 2017-02-10 | Stop reason: SURG

## 2017-02-10 RX ORDER — OXYTOCIN 10 [USP'U]/ML
INJECTION, SOLUTION INTRAMUSCULAR; INTRAVENOUS AS NEEDED
Status: DISCONTINUED | OUTPATIENT
Start: 2017-02-10 | End: 2017-02-10 | Stop reason: SURG

## 2017-02-10 RX ORDER — ACETAMINOPHEN 160 MG/5ML
650 SOLUTION ORAL EVERY 4 HOURS PRN
Status: DISCONTINUED | OUTPATIENT
Start: 2017-02-10 | End: 2017-02-15 | Stop reason: HOSPADM

## 2017-02-10 RX ORDER — ONDANSETRON 4 MG/1
4 TABLET, FILM COATED ORAL EVERY 8 HOURS PRN
Status: DISCONTINUED | OUTPATIENT
Start: 2017-02-10 | End: 2017-02-15 | Stop reason: HOSPADM

## 2017-02-10 RX ORDER — OXYTOCIN/RINGER'S LACTATE 20/1000 ML
999 PLASTIC BAG, INJECTION (ML) INTRAVENOUS ONCE
Status: CANCELLED | OUTPATIENT
Start: 2017-02-10 | End: 2017-02-10

## 2017-02-10 RX ORDER — OXYCODONE HYDROCHLORIDE AND ACETAMINOPHEN 5; 325 MG/1; MG/1
1 TABLET ORAL EVERY 4 HOURS PRN
Status: DISCONTINUED | OUTPATIENT
Start: 2017-02-10 | End: 2017-02-10

## 2017-02-10 RX ORDER — OXYTOCIN/RINGER'S LACTATE 30/500 ML
2-24 PLASTIC BAG, INJECTION (ML) INTRAVENOUS
Status: DISCONTINUED | OUTPATIENT
Start: 2017-02-10 | End: 2017-02-10

## 2017-02-10 RX ORDER — ONDANSETRON 2 MG/ML
4 INJECTION INTRAMUSCULAR; INTRAVENOUS EVERY 6 HOURS PRN
Status: DISCONTINUED | OUTPATIENT
Start: 2017-02-10 | End: 2017-02-15 | Stop reason: HOSPADM

## 2017-02-10 RX ORDER — LIDOCAINE HYDROCHLORIDE AND EPINEPHRINE 20; 5 MG/ML; UG/ML
INJECTION, SOLUTION EPIDURAL; INFILTRATION; INTRACAUDAL; PERINEURAL AS NEEDED
Status: DISCONTINUED | OUTPATIENT
Start: 2017-02-10 | End: 2017-02-10 | Stop reason: SURG

## 2017-02-10 RX ORDER — MORPHINE SULFATE 4 MG/ML
2 INJECTION, SOLUTION INTRAMUSCULAR; INTRAVENOUS
Status: DISCONTINUED | OUTPATIENT
Start: 2017-02-10 | End: 2017-02-13

## 2017-02-10 RX ORDER — METHYLERGONOVINE MALEATE 0.2 MG/ML
200 INJECTION INTRAVENOUS ONCE
Status: DISCONTINUED | OUTPATIENT
Start: 2017-02-10 | End: 2017-02-15 | Stop reason: HOSPADM

## 2017-02-10 RX ORDER — CALCIUM CARBONATE 200(500)MG
1 TABLET,CHEWABLE ORAL EVERY 6 HOURS PRN
Status: DISCONTINUED | OUTPATIENT
Start: 2017-02-10 | End: 2017-02-15 | Stop reason: HOSPADM

## 2017-02-10 RX ORDER — MORPHINE SULFATE 0.5 MG/ML
INJECTION, SOLUTION EPIDURAL; INTRATHECAL; INTRAVENOUS AS NEEDED
Status: DISCONTINUED | OUTPATIENT
Start: 2017-02-10 | End: 2017-02-10 | Stop reason: SURG

## 2017-02-10 RX ORDER — HYDROCODONE BITARTRATE AND ACETAMINOPHEN 7.5; 325 MG/1; MG/1
1 TABLET ORAL EVERY 4 HOURS PRN
Status: DISCONTINUED | OUTPATIENT
Start: 2017-02-10 | End: 2017-02-15 | Stop reason: HOSPADM

## 2017-02-10 RX ORDER — DOCUSATE SODIUM 100 MG/1
100 CAPSULE, LIQUID FILLED ORAL 2 TIMES DAILY PRN
Status: DISCONTINUED | OUTPATIENT
Start: 2017-02-10 | End: 2017-02-15 | Stop reason: HOSPADM

## 2017-02-10 RX ORDER — ACETAMINOPHEN 650 MG/1
650 SUPPOSITORY RECTAL EVERY 4 HOURS PRN
Status: DISCONTINUED | OUTPATIENT
Start: 2017-02-10 | End: 2017-02-15 | Stop reason: HOSPADM

## 2017-02-10 RX ORDER — OXYTOCIN/RINGER'S LACTATE 20/1000 ML
999 PLASTIC BAG, INJECTION (ML) INTRAVENOUS ONCE
Status: DISCONTINUED | OUTPATIENT
Start: 2017-02-10 | End: 2017-02-10 | Stop reason: HOSPADM

## 2017-02-10 RX ORDER — PROMETHAZINE HYDROCHLORIDE 25 MG/1
25 TABLET ORAL EVERY 6 HOURS PRN
Status: DISCONTINUED | OUTPATIENT
Start: 2017-02-10 | End: 2017-02-15 | Stop reason: HOSPADM

## 2017-02-10 RX ORDER — PROMETHAZINE HYDROCHLORIDE 25 MG/ML
12.5 INJECTION, SOLUTION INTRAMUSCULAR; INTRAVENOUS EVERY 6 HOURS PRN
Status: DISCONTINUED | OUTPATIENT
Start: 2017-02-10 | End: 2017-02-13

## 2017-02-10 RX ORDER — LIDOCAINE HYDROCHLORIDE AND EPINEPHRINE 15; 5 MG/ML; UG/ML
INJECTION, SOLUTION EPIDURAL AS NEEDED
Status: DISCONTINUED | OUTPATIENT
Start: 2017-02-10 | End: 2017-02-10 | Stop reason: SURG

## 2017-02-10 RX ORDER — ACETAMINOPHEN 325 MG/1
650 TABLET ORAL EVERY 4 HOURS PRN
Status: DISCONTINUED | OUTPATIENT
Start: 2017-02-10 | End: 2017-02-15 | Stop reason: HOSPADM

## 2017-02-10 RX ORDER — CEFAZOLIN SODIUM 2 G/100ML
2 INJECTION, SOLUTION INTRAVENOUS ONCE
Status: COMPLETED | OUTPATIENT
Start: 2017-02-10 | End: 2017-02-10

## 2017-02-10 RX ORDER — HYDROMORPHONE HYDROCHLORIDE 1 MG/ML
0.5 INJECTION, SOLUTION INTRAMUSCULAR; INTRAVENOUS; SUBCUTANEOUS
Status: DISCONTINUED | OUTPATIENT
Start: 2017-02-10 | End: 2017-02-15 | Stop reason: HOSPADM

## 2017-02-10 RX ORDER — LANOLIN 100 %
OINTMENT (GRAM) TOPICAL
Status: DISCONTINUED | OUTPATIENT
Start: 2017-02-10 | End: 2017-02-15 | Stop reason: HOSPADM

## 2017-02-10 RX ORDER — EPHEDRINE SULFATE/0.9% NACL/PF 50 MG/10ML
10 SYRINGE (ML) INTRAVENOUS
Status: DISCONTINUED | OUTPATIENT
Start: 2017-02-10 | End: 2017-02-10 | Stop reason: HOSPADM

## 2017-02-10 RX ORDER — PROMETHAZINE HYDROCHLORIDE 12.5 MG/1
12.5 SUPPOSITORY RECTAL EVERY 6 HOURS PRN
Status: DISCONTINUED | OUTPATIENT
Start: 2017-02-10 | End: 2017-02-15 | Stop reason: HOSPADM

## 2017-02-10 RX ORDER — SODIUM CHLORIDE, SODIUM LACTATE, POTASSIUM CHLORIDE, CALCIUM CHLORIDE 600; 310; 30; 20 MG/100ML; MG/100ML; MG/100ML; MG/100ML
125 INJECTION, SOLUTION INTRAVENOUS CONTINUOUS
Status: DISCONTINUED | OUTPATIENT
Start: 2017-02-10 | End: 2017-02-10

## 2017-02-10 RX ORDER — SODIUM CHLORIDE 0.9 % (FLUSH) 0.9 %
1-10 SYRINGE (ML) INJECTION AS NEEDED
Status: DISCONTINUED | OUTPATIENT
Start: 2017-02-10 | End: 2017-02-15 | Stop reason: HOSPADM

## 2017-02-10 RX ORDER — OXYTOCIN/RINGER'S LACTATE 20/1000 ML
125 PLASTIC BAG, INJECTION (ML) INTRAVENOUS AS NEEDED
Status: CANCELLED | OUTPATIENT
Start: 2017-02-10 | End: 2017-02-11

## 2017-02-10 RX ORDER — OXYTOCIN/RINGER'S LACTATE 20/1000 ML
125 PLASTIC BAG, INJECTION (ML) INTRAVENOUS AS NEEDED
Status: DISCONTINUED | OUTPATIENT
Start: 2017-02-10 | End: 2017-02-10 | Stop reason: HOSPADM

## 2017-02-10 RX ORDER — MORPHINE SULFATE 4 MG/ML
2 INJECTION, SOLUTION INTRAMUSCULAR; INTRAVENOUS
Status: ACTIVE | OUTPATIENT
Start: 2017-02-10 | End: 2017-02-11

## 2017-02-10 RX ORDER — SODIUM CHLORIDE, SODIUM LACTATE, POTASSIUM CHLORIDE, CALCIUM CHLORIDE 600; 310; 30; 20 MG/100ML; MG/100ML; MG/100ML; MG/100ML
125 INJECTION, SOLUTION INTRAVENOUS CONTINUOUS
Status: DISCONTINUED | OUTPATIENT
Start: 2017-02-10 | End: 2017-02-10 | Stop reason: SDUPTHER

## 2017-02-10 RX ORDER — PROMETHAZINE HYDROCHLORIDE 25 MG/ML
12.5 INJECTION, SOLUTION INTRAMUSCULAR; INTRAVENOUS EVERY 6 HOURS PRN
Status: DISCONTINUED | OUTPATIENT
Start: 2017-02-10 | End: 2017-02-15 | Stop reason: HOSPADM

## 2017-02-10 RX ORDER — IBUPROFEN 600 MG/1
600 TABLET ORAL EVERY 6 HOURS PRN
Status: DISPENSED | OUTPATIENT
Start: 2017-02-10 | End: 2017-02-11

## 2017-02-10 RX ORDER — DIPHENHYDRAMINE HCL 25 MG
25 CAPSULE ORAL EVERY 4 HOURS PRN
Status: DISCONTINUED | OUTPATIENT
Start: 2017-02-10 | End: 2017-02-13

## 2017-02-10 RX ORDER — METOCLOPRAMIDE HYDROCHLORIDE 5 MG/ML
INJECTION INTRAMUSCULAR; INTRAVENOUS AS NEEDED
Status: DISCONTINUED | OUTPATIENT
Start: 2017-02-10 | End: 2017-02-10 | Stop reason: SURG

## 2017-02-10 RX ORDER — MISOPROSTOL 200 UG/1
600 TABLET ORAL ONCE
Status: DISCONTINUED | OUTPATIENT
Start: 2017-02-10 | End: 2017-02-15 | Stop reason: HOSPADM

## 2017-02-10 RX ORDER — METHYLERGONOVINE MALEATE 0.2 MG/ML
200 INJECTION INTRAVENOUS ONCE AS NEEDED
Status: CANCELLED | OUTPATIENT
Start: 2017-02-10

## 2017-02-10 RX ORDER — METOCLOPRAMIDE HYDROCHLORIDE 5 MG/ML
10 INJECTION INTRAMUSCULAR; INTRAVENOUS EVERY 4 HOURS PRN
Status: DISCONTINUED | OUTPATIENT
Start: 2017-02-10 | End: 2017-02-13

## 2017-02-10 RX ORDER — CEFAZOLIN SODIUM 2 G/100ML
2 INJECTION, SOLUTION INTRAVENOUS ONCE
Status: COMPLETED | OUTPATIENT
Start: 2017-02-11 | End: 2017-02-11

## 2017-02-10 RX ORDER — ONDANSETRON 4 MG/1
4 TABLET, FILM COATED ORAL EVERY 6 HOURS PRN
Status: DISCONTINUED | OUTPATIENT
Start: 2017-02-10 | End: 2017-02-15 | Stop reason: HOSPADM

## 2017-02-10 RX ORDER — FAMOTIDINE 10 MG/ML
20 INJECTION, SOLUTION INTRAVENOUS ONCE AS NEEDED
Status: DISCONTINUED | OUTPATIENT
Start: 2017-02-10 | End: 2017-02-10 | Stop reason: HOSPADM

## 2017-02-10 RX ORDER — ZOLPIDEM TARTRATE 5 MG/1
5 TABLET ORAL NIGHTLY PRN
Status: DISCONTINUED | OUTPATIENT
Start: 2017-02-10 | End: 2017-02-15 | Stop reason: HOSPADM

## 2017-02-10 RX ORDER — MAGNESIUM HYDROXIDE/ALUMINUM HYDROXICE/SIMETHICONE 120; 1200; 1200 MG/30ML; MG/30ML; MG/30ML
30 SUSPENSION ORAL EVERY 4 HOURS PRN
Status: DISCONTINUED | OUTPATIENT
Start: 2017-02-10 | End: 2017-02-15 | Stop reason: HOSPADM

## 2017-02-10 RX ORDER — KETOROLAC TROMETHAMINE 15 MG/ML
15 INJECTION, SOLUTION INTRAMUSCULAR; INTRAVENOUS EVERY 6 HOURS PRN
Status: ACTIVE | OUTPATIENT
Start: 2017-02-10 | End: 2017-02-11

## 2017-02-10 RX ORDER — ONDANSETRON 2 MG/ML
4 INJECTION INTRAMUSCULAR; INTRAVENOUS ONCE AS NEEDED
Status: DISCONTINUED | OUTPATIENT
Start: 2017-02-10 | End: 2017-02-10 | Stop reason: SDUPTHER

## 2017-02-10 RX ORDER — MISOPROSTOL 200 UG/1
800 TABLET ORAL AS NEEDED
Status: CANCELLED | OUTPATIENT
Start: 2017-02-10

## 2017-02-10 RX ORDER — DIPHENHYDRAMINE HYDROCHLORIDE 50 MG/ML
25 INJECTION INTRAMUSCULAR; INTRAVENOUS EVERY 4 HOURS PRN
Status: DISCONTINUED | OUTPATIENT
Start: 2017-02-10 | End: 2017-02-13

## 2017-02-10 RX ORDER — OXYCODONE HYDROCHLORIDE AND ACETAMINOPHEN 5; 325 MG/1; MG/1
2 TABLET ORAL EVERY 4 HOURS PRN
Status: DISPENSED | OUTPATIENT
Start: 2017-02-10 | End: 2017-02-11

## 2017-02-10 RX ORDER — PRENATAL WITH FERROUS FUM AND FOLIC ACID 3080; 920; 120; 400; 22; 1.84; 3; 20; 10; 1; 12; 200; 27; 25; 2 [IU]/1; [IU]/1; MG/1; [IU]/1; MG/1; MG/1; MG/1; MG/1; MG/1; MG/1; UG/1; MG/1; MG/1; MG/1; MG/1
1 TABLET ORAL DAILY
Status: DISCONTINUED | OUTPATIENT
Start: 2017-02-11 | End: 2017-02-10

## 2017-02-10 RX ORDER — DIPHENHYDRAMINE HCL 25 MG
25 CAPSULE ORAL EVERY 4 HOURS PRN
Status: DISCONTINUED | OUTPATIENT
Start: 2017-02-10 | End: 2017-02-15 | Stop reason: HOSPADM

## 2017-02-10 RX ORDER — SIMETHICONE 80 MG
80 TABLET,CHEWABLE ORAL 4 TIMES DAILY PRN
Status: DISCONTINUED | OUTPATIENT
Start: 2017-02-10 | End: 2017-02-15 | Stop reason: HOSPADM

## 2017-02-10 RX ORDER — HYDROCODONE BITARTRATE AND ACETAMINOPHEN 5; 325 MG/1; MG/1
1 TABLET ORAL EVERY 4 HOURS PRN
Status: DISCONTINUED | OUTPATIENT
Start: 2017-02-10 | End: 2017-02-15 | Stop reason: HOSPADM

## 2017-02-10 RX ORDER — CARBOPROST TROMETHAMINE 250 UG/ML
250 INJECTION, SOLUTION INTRAMUSCULAR AS NEEDED
Status: CANCELLED | OUTPATIENT
Start: 2017-02-10

## 2017-02-10 RX ORDER — DIPHENHYDRAMINE HYDROCHLORIDE 50 MG/ML
12.5 INJECTION INTRAMUSCULAR; INTRAVENOUS EVERY 8 HOURS PRN
Status: DISCONTINUED | OUTPATIENT
Start: 2017-02-10 | End: 2017-02-10 | Stop reason: SDUPTHER

## 2017-02-10 RX ORDER — METOCLOPRAMIDE HYDROCHLORIDE 5 MG/ML
10 INJECTION INTRAMUSCULAR; INTRAVENOUS ONCE AS NEEDED
Status: DISCONTINUED | OUTPATIENT
Start: 2017-02-10 | End: 2017-02-10 | Stop reason: SDUPTHER

## 2017-02-10 RX ORDER — HYDROXYZINE HYDROCHLORIDE 25 MG/1
50 TABLET, FILM COATED ORAL EVERY 6 HOURS PRN
Status: DISCONTINUED | OUTPATIENT
Start: 2017-02-10 | End: 2017-02-15 | Stop reason: HOSPADM

## 2017-02-10 RX ORDER — CHLOROPROCAINE HYDROCHLORIDE 30 MG/ML
INJECTION, SOLUTION EPIDURAL; INFILTRATION; INTRACAUDAL; PERINEURAL AS NEEDED
Status: DISCONTINUED | OUTPATIENT
Start: 2017-02-10 | End: 2017-02-10 | Stop reason: SURG

## 2017-02-10 RX ORDER — CARBOPROST TROMETHAMINE 250 UG/ML
250 INJECTION, SOLUTION INTRAMUSCULAR ONCE
Status: DISCONTINUED | OUTPATIENT
Start: 2017-02-10 | End: 2017-02-15 | Stop reason: HOSPADM

## 2017-02-10 RX ORDER — MIDAZOLAM HYDROCHLORIDE 1 MG/ML
INJECTION INTRAMUSCULAR; INTRAVENOUS AS NEEDED
Status: DISCONTINUED | OUTPATIENT
Start: 2017-02-10 | End: 2017-02-10 | Stop reason: SURG

## 2017-02-10 RX ORDER — BISACODYL 10 MG
10 SUPPOSITORY, RECTAL RECTAL DAILY PRN
Status: DISCONTINUED | OUTPATIENT
Start: 2017-02-10 | End: 2017-02-15 | Stop reason: HOSPADM

## 2017-02-10 RX ORDER — ONDANSETRON 2 MG/ML
INJECTION INTRAMUSCULAR; INTRAVENOUS AS NEEDED
Status: DISCONTINUED | OUTPATIENT
Start: 2017-02-10 | End: 2017-02-10 | Stop reason: SURG

## 2017-02-10 RX ORDER — NALOXONE HCL 0.4 MG/ML
0.4 VIAL (ML) INJECTION
Status: ACTIVE | OUTPATIENT
Start: 2017-02-10 | End: 2017-02-11

## 2017-02-10 RX ORDER — SODIUM CHLORIDE 0.9 % (FLUSH) 0.9 %
1-10 SYRINGE (ML) INJECTION AS NEEDED
Status: DISCONTINUED | OUTPATIENT
Start: 2017-02-10 | End: 2017-02-10 | Stop reason: HOSPADM

## 2017-02-10 RX ORDER — PRENATAL WITH FERROUS FUM AND FOLIC ACID 3080; 920; 120; 400; 22; 1.84; 3; 20; 10; 1; 12; 200; 27; 25; 2 [IU]/1; [IU]/1; MG/1; [IU]/1; MG/1; MG/1; MG/1; MG/1; MG/1; MG/1; UG/1; MG/1; MG/1; MG/1; MG/1
1 TABLET ORAL DAILY
Status: DISCONTINUED | OUTPATIENT
Start: 2017-02-11 | End: 2017-02-15 | Stop reason: HOSPADM

## 2017-02-10 RX ORDER — SODIUM CHLORIDE, SODIUM LACTATE, POTASSIUM CHLORIDE, CALCIUM CHLORIDE 600; 310; 30; 20 MG/100ML; MG/100ML; MG/100ML; MG/100ML
125 INJECTION, SOLUTION INTRAVENOUS CONTINUOUS
Status: DISCONTINUED | OUTPATIENT
Start: 2017-02-10 | End: 2017-02-15 | Stop reason: HOSPADM

## 2017-02-10 RX ADMIN — SODIUM CHLORIDE, POTASSIUM CHLORIDE, SODIUM LACTATE AND CALCIUM CHLORIDE: 600; 310; 30; 20 INJECTION, SOLUTION INTRAVENOUS at 18:43

## 2017-02-10 RX ADMIN — CHLOROPROCAINE HYDROCHLORIDE 5 ML: 30 INJECTION, SOLUTION EPIDURAL; INFILTRATION; INTRACAUDAL; PERINEURAL at 18:57

## 2017-02-10 RX ADMIN — FENTANYL CITRATE 50 MCG: 50 INJECTION, SOLUTION INTRAMUSCULAR; INTRAVENOUS at 19:15

## 2017-02-10 RX ADMIN — LIDOCAINE HYDROCHLORIDE AND EPINEPHRINE 2 ML: 15; 5 INJECTION, SOLUTION EPIDURAL at 14:30

## 2017-02-10 RX ADMIN — MIDAZOLAM HYDROCHLORIDE 1 MG: 1 INJECTION, SOLUTION INTRAMUSCULAR; INTRAVENOUS at 18:44

## 2017-02-10 RX ADMIN — OXYTOCIN 30 UNITS: 10 INJECTION, SOLUTION INTRAMUSCULAR; INTRAVENOUS at 19:03

## 2017-02-10 RX ADMIN — MORPHINE SULFATE 2 MG: 0.5 INJECTION, SOLUTION EPIDURAL; INTRATHECAL; INTRAVENOUS at 19:13

## 2017-02-10 RX ADMIN — CHLOROPROCAINE HYDROCHLORIDE 5 ML: 30 INJECTION, SOLUTION EPIDURAL; INFILTRATION; INTRACAUDAL; PERINEURAL at 19:15

## 2017-02-10 RX ADMIN — MIDAZOLAM HYDROCHLORIDE 2 MG: 1 INJECTION, SOLUTION INTRAMUSCULAR; INTRAVENOUS at 18:50

## 2017-02-10 RX ADMIN — LIDOCAINE HYDROCHLORIDE,EPINEPHRINE BITARTRATE 10 ML: 20; .005 INJECTION, SOLUTION EPIDURAL; INFILTRATION; INTRACAUDAL; PERINEURAL at 18:20

## 2017-02-10 RX ADMIN — Medication 999 ML/HR: at 21:00

## 2017-02-10 RX ADMIN — FAMOTIDINE 20 MG: 10 INJECTION, SOLUTION INTRAVENOUS at 18:47

## 2017-02-10 RX ADMIN — MORPHINE SULFATE 3 MG: 0.5 INJECTION, SOLUTION EPIDURAL; INTRATHECAL; INTRAVENOUS at 19:04

## 2017-02-10 RX ADMIN — FENTANYL CITRATE 50 MCG: 50 INJECTION, SOLUTION INTRAMUSCULAR; INTRAVENOUS at 19:20

## 2017-02-10 RX ADMIN — CEFAZOLIN SODIUM 2 G: 2 INJECTION, SOLUTION INTRAVENOUS at 18:31

## 2017-02-10 RX ADMIN — FENTANYL CITRATE 100 MCG: 50 INJECTION, SOLUTION INTRAMUSCULAR; INTRAVENOUS at 14:30

## 2017-02-10 RX ADMIN — OXYCODONE AND ACETAMINOPHEN 2 TABLET: 5; 325 TABLET ORAL at 20:26

## 2017-02-10 RX ADMIN — ROPIVACAINE HYDROCHLORIDE 16 ML/HR: 5 INJECTION, SOLUTION EPIDURAL; INFILTRATION; PERINEURAL at 14:37

## 2017-02-10 RX ADMIN — METOCLOPRAMIDE 10 MG: 5 INJECTION, SOLUTION INTRAMUSCULAR; INTRAVENOUS at 18:46

## 2017-02-10 RX ADMIN — SODIUM CHLORIDE, POTASSIUM CHLORIDE, SODIUM LACTATE AND CALCIUM CHLORIDE 125 ML/HR: 600; 310; 30; 20 INJECTION, SOLUTION INTRAVENOUS at 22:30

## 2017-02-10 RX ADMIN — SODIUM CHLORIDE, POTASSIUM CHLORIDE, SODIUM LACTATE AND CALCIUM CHLORIDE 3000 ML: 600; 310; 30; 20 INJECTION, SOLUTION INTRAVENOUS at 14:06

## 2017-02-10 RX ADMIN — SODIUM CITRATE AND CITRIC ACID MONOHYDRATE 30 ML: 500; 334 SOLUTION ORAL at 18:31

## 2017-02-10 RX ADMIN — ROPIVACAINE HYDROCHLORIDE 10 ML: 5 INJECTION, SOLUTION EPIDURAL; INFILTRATION; PERINEURAL at 14:34

## 2017-02-10 RX ADMIN — MIDAZOLAM HYDROCHLORIDE 1 MG: 1 INJECTION, SOLUTION INTRAMUSCULAR; INTRAVENOUS at 18:55

## 2017-02-10 RX ADMIN — MIDAZOLAM HYDROCHLORIDE 1 MG: 1 INJECTION, SOLUTION INTRAMUSCULAR; INTRAVENOUS at 19:00

## 2017-02-10 RX ADMIN — BUTORPHANOL TARTRATE 1 MG: 2 INJECTION, SOLUTION INTRAMUSCULAR; INTRAVENOUS at 11:18

## 2017-02-10 RX ADMIN — OXYTOCIN 30 UNITS: 10 INJECTION, SOLUTION INTRAMUSCULAR; INTRAVENOUS at 19:23

## 2017-02-10 RX ADMIN — SODIUM CHLORIDE, POTASSIUM CHLORIDE, SODIUM LACTATE AND CALCIUM CHLORIDE 125 ML/HR: 600; 310; 30; 20 INJECTION, SOLUTION INTRAVENOUS at 13:07

## 2017-02-10 RX ADMIN — ONDANSETRON 4 MG: 2 INJECTION INTRAMUSCULAR; INTRAVENOUS at 18:57

## 2017-02-10 RX ADMIN — SODIUM CHLORIDE, POTASSIUM CHLORIDE, SODIUM LACTATE AND CALCIUM CHLORIDE: 600; 310; 30; 20 INJECTION, SOLUTION INTRAVENOUS at 19:23

## 2017-02-10 RX ADMIN — LIDOCAINE HYDROCHLORIDE AND EPINEPHRINE 3 ML: 15; 5 INJECTION, SOLUTION EPIDURAL at 14:27

## 2017-02-10 RX ADMIN — Medication 2 MILLI-UNITS/MIN: at 13:57

## 2017-02-10 NOTE — ANESTHESIA PREPROCEDURE EVALUATION
Anesthesia Evaluation     Patient summary reviewed and Nursing notes reviewed   no history of anesthetic complications:     Airway   Mallampati: II  TM distance: >3 FB  Neck ROM: full  possible difficult intubation  Dental - normal exam     Pulmonary    (+) a smoker Former,   Cardiovascular - negative cardio ROS        Neuro/Psych- negative ROS  GI/Hepatic/Renal/Endo    (+) morbid obesity,     Musculoskeletal (-) negative ROS    Abdominal    Substance History   (+) drug use (H/O Marijuana use)     OB/GYN    (+) Pregnant,         Other - negative ROS                                   Anesthesia Plan    ASA 3     epidural     Anesthetic plan and risks discussed with patient.

## 2017-02-10 NOTE — PROGRESS NOTES
She is not really changed much : ~ 5 cm /80% / vertex -3  IUPC placed , she is on 4 carmen iu pitocin , variability okay and some accels; contractions seem adequate  If no change or more decels may  Need

## 2017-02-10 NOTE — PROGRESS NOTES
She has not progressed past 5-6 cm with the vertex at -2 or -3 station. She has had another deceleration off of pitocin. I have discussed this with her, she is disappointed about a  but the Laborist and I feel it is in fetal interest.

## 2017-02-10 NOTE — H&P
TAMICA Garcia  Obstetric History and Physical    Chief Complaint   Patient presents with   • Contractions       Subjective     Patient is a 26 y.o. female  currently at 38w5d, who presents with C/O alar moderate contractions since last p.m, without leaking of fluid or vaginal bleeding.  Patient reports normal fetal activity.  Denies any headache, changes, nausea vomiting, epigastric pain, change of edema.. Pre  course  Complicated by transient elevated blood pressure normal PEP and 24hr protein in the  17.  with Dr. Ledbetter    Her prenatal care is complicated by  none.  Her previous obstetric/gynecological history is noted for is remarkable for .    The following portions of the patients history were reviewed and updated as appropriate: current medications, allergies, past medical history, past surgical history, past family history, past social history and problem list .       Prenatal Information:   Maternal Prenatal Labs  Blood Type No results found for: ABO   Rh Status No results found for: RH   Antibody Screen No results found for: ABSCRN   Gonnorhea No results found for: GCCX   Chlamydia No results found for: CLAMYDCU   RPR No results found for: RPR   Syphilis Antibody No results found for: SYPHILIS   Rubella No results found for: RUBELLAIGGIN   Hepatitis B Surface Antigen No results found for: HEPBSAG   HIV-1 Antibody No results found for: LABHIV1   Hepatitis C Antibody No results found for: HEPCAB   Rapid Urin Drug Screen No results found for: AMPMETHU, BARBITSCNUR, LABBENZSCN, LABMETHSCN, LABOPIASCN, THCURSCR, COCAINEUR, AMPHETSCREEN, PROPOXSCN, BUPRENORSCNU, METAMPSCNUR, OXYCODONESCN, TRICYCLICSCN   Group B Strep Culture No results found for: GBSANTIGEN           External Prenatal Results         Pregnancy Outside Results - these were transcribed from office records.  See scanned records for details. Date Time   Hgb      Hct      ABO ^ O  16    Rh ^ Positive  16    Antibody Screen  ^ Negative  16    Glucose Fasting GTT      Glucose Tolerance Test 1 hour ^ 131  16    Glucose Tolerance Test 3 hour ^ 91/149/95/101  16    Gonorrhea (discrete)      Chlamydia (discrete)      RPR ^ Non-Reactive  16    VDRL      Syphillis Antibody      Rubella      HBsAg ^ Negative  16    Herpes Simplex Virus PCR      Herpes Simplex VIrus Culture      HIV ^ Negative  16    Hep C RNA Quant PCR      Hep C Antibody ^ Negative  16    Urine Drug Screen      AFP      Group B Strep      GBS Susceptibility to Clindamycin      GBS Susceptibility to Eythromycin      Fetal Fibronectin      Genetic Testing, Maternal Blood             Legend: ^: Historical            Past OB History:       Obstetric History       T0      TAB0   SAB0   E0   M0   L0       # Outcome Date GA Lbr Buck/2nd Weight Sex Delivery Anes PTL Lv   1 Current                   Past Medical History: History reviewed. No pertinent past medical history.   Past Surgical History History reviewed. No pertinent past surgical history.   Family History: Family History   Problem Relation Age of Onset   • No Known Problems Father    • Hypertension Mother    • Hyperlipidemia Mother    • Breast cancer Paternal Aunt       Social History:  reports that she has never smoked. She has never used smokeless tobacco.   reports that she drinks alcohol.   reports that she uses illicit drugs, including Marijuana, about 3 times per week.                   General ROS Negative Findings:Headaches, Visual Changes, Epigastric pain, Anorexia, Nausia/Vomiting, ROM and Vaginal Bleeding    ROS      Objective       Vital Signs Range for the last 24 hours  Temperature: Temp:  [98.3 °F (36.8 °C)] 98.3 °F (36.8 °C)   Temp Source: Temp src: Oral   BP: BP: (144-163)/() 163/112   Pulse: Heart Rate:  [84-93] 84   Respirations: Resp:  [16] 16   SPO2:     O2 Amount (l/min):     O2 Devices     Weight: Weight:  [240 lb (109 kg)] 240 lb (109 kg)      Physical Examination:   General:   alert, appears stated age and cooperative   Skin:   normal   HEENT:     Lungs:   clear to auscultation bilaterally   Heart:   regular rate and rhythm, S1, S2 normal, no murmur, click, rub or gallop   Abdomen:  gravid uterus-nontender, no rebound, no guarding, negative CVA tenderness.     Lower Extremeties Tr edema, no calf tenderness,DTR 2+/4 no clonus   Pelvis:  External genitalia: normal general appearance  Vaginal: normal without tenderness, induration or masses  Uterus: enlarged         Presentation: vtx   Cervix: Exam by: Method: sterile exam per physician   Dilation: Dilation: 3.4   Effacement: Cervical Effacement: 80%   Station: Station: -2       Fetal Heart Rate Assessment   Method:     Beats/min:     Baseline:     Varibility:     Accels:     Decels:     Tracing Category:       Uterine Assessment   Method: Method: TOCO (external toco transducer)   Frequency (min): Contraction Frequency (min): 2-7   Ctx Count in 10 min:     Duration: Contraction Duration (sec): 70-90   Intensity: Contraction Intensity: mild by palpation   Intensity by IUPC:     Resting Tone: Uterine Resting Tone: soft by palpation   Resting Tone by IUPC:     [place] Units:       Laboratory Results: @tlpdhtbn89@  Radiology Review:@lastrad@  Other Studies:    Assessment/Plan     Active Problems:    Pregnancy        Assessment:  1.  Intrauterine pregnancy at 38w5d weeks gestation with reactive fetal status.    2.  Labor GBS unknown  3.  Gestational HTN  4.      Plan:  1. fetal and uterine monitoring  continuously, labor augmentation  Pitocin and analgesia with  epidural, Draw pre eclampsia panel  2. Plan of care has been reviewed with patient.  3.  Risks, benefits of treatment plan have been discussed.  4.  All questions have been answered.  5   D/W Dr Vladimir Albert, DO  2/10/2017  10:08 AM

## 2017-02-10 NOTE — ANESTHESIA PROCEDURE NOTES
Labor Epidural    Patient location during procedure: OB  Performed By  Anesthesiologist: CHAR DOWNS  CRNA: CHER PEARL  Preanesthetic Checklist  Completed: patient identified, surgical consent, pre-op evaluation, timeout performed, IV checked, risks and benefits discussed and monitors and equipment checked  Epidural Block Prep:  Pt Position:sitting  Sterile Tech:cap, gloves, mask and sterile barrier  Monitoring:blood pressure monitoring  Epidural Block Procedure:  Approach:midline  Guidance:palpation technique  Location:L4-L5  Needle Type:Tuohy  Needle Gauge:17 G  Cath Depth at skin:14 cm  Paresthesia: left and transient  Aspiration:negative  Test Dose:negative  Post Assessment:  Dressing:occlusive dressing applied and secured with tape  Pt Tolerance:patient tolerated the procedure well with no apparent complications  Complications:no

## 2017-02-10 NOTE — PROGRESS NOTES
Good fetal heart tones.  No change in cervix per RN.  Cervical exam for approximately 80% -2 vertex AROM clear slightly bloody fluid.  We'll also augment with Pitocin.

## 2017-02-11 LAB
BASOPHILS # BLD AUTO: 0.01 10*3/MM3 (ref 0–0.2)
BASOPHILS NFR BLD AUTO: 0.1 % (ref 0–1)
DEPRECATED RDW RBC AUTO: 49.2 FL (ref 37–54)
EOSINOPHIL # BLD AUTO: 0.03 10*3/MM3 (ref 0.1–0.3)
EOSINOPHIL NFR BLD AUTO: 0.2 % (ref 0–3)
ERYTHROCYTE [DISTWIDTH] IN BLOOD BY AUTOMATED COUNT: 17.7 % (ref 11.3–14.5)
HCT VFR BLD AUTO: 27.9 % (ref 34.5–44)
HGB BLD-MCNC: 9 G/DL (ref 11.5–15.5)
IMM GRANULOCYTES # BLD: 0.03 10*3/MM3 (ref 0–0.03)
IMM GRANULOCYTES NFR BLD: 0.2 % (ref 0–0.6)
LYMPHOCYTES # BLD AUTO: 1.96 10*3/MM3 (ref 0.6–4.8)
LYMPHOCYTES NFR BLD AUTO: 15.5 % (ref 24–44)
MCH RBC QN AUTO: 24.6 PG (ref 27–31)
MCHC RBC AUTO-ENTMCNC: 32.3 G/DL (ref 32–36)
MCV RBC AUTO: 76.2 FL (ref 80–99)
MONOCYTES # BLD AUTO: 0.87 10*3/MM3 (ref 0–1)
MONOCYTES NFR BLD AUTO: 6.9 % (ref 0–12)
NEUTROPHILS # BLD AUTO: 9.78 10*3/MM3 (ref 1.5–8.3)
NEUTROPHILS NFR BLD AUTO: 77.1 % (ref 41–71)
PLATELET # BLD AUTO: 249 10*3/MM3 (ref 150–450)
PMV BLD AUTO: 10.4 FL (ref 6–12)
RBC # BLD AUTO: 3.66 10*6/MM3 (ref 3.89–5.14)
WBC NRBC COR # BLD: 12.68 10*3/MM3 (ref 3.5–10.8)

## 2017-02-11 PROCEDURE — 25010000003 CEFAZOLIN IN DEXTROSE 2-4 GM/100ML-% SOLUTION: Performed by: OBSTETRICS & GYNECOLOGY

## 2017-02-11 PROCEDURE — 99231 SBSQ HOSP IP/OBS SF/LOW 25: CPT | Performed by: OBSTETRICS & GYNECOLOGY

## 2017-02-11 PROCEDURE — 85025 COMPLETE CBC W/AUTO DIFF WBC: CPT | Performed by: OBSTETRICS & GYNECOLOGY

## 2017-02-11 RX ORDER — IBUPROFEN 600 MG/1
600 TABLET ORAL EVERY 6 HOURS PRN
Status: DISCONTINUED | OUTPATIENT
Start: 2017-02-11 | End: 2017-02-15 | Stop reason: HOSPADM

## 2017-02-11 RX ORDER — OXYCODONE HYDROCHLORIDE AND ACETAMINOPHEN 5; 325 MG/1; MG/1
2 TABLET ORAL EVERY 4 HOURS PRN
Status: DISCONTINUED | OUTPATIENT
Start: 2017-02-11 | End: 2017-02-15 | Stop reason: HOSPADM

## 2017-02-11 RX ORDER — OXYCODONE HYDROCHLORIDE AND ACETAMINOPHEN 5; 325 MG/1; MG/1
1 TABLET ORAL EVERY 4 HOURS PRN
Status: DISCONTINUED | OUTPATIENT
Start: 2017-02-11 | End: 2017-02-15 | Stop reason: HOSPADM

## 2017-02-11 RX ORDER — FERROUS SULFATE 325(65) MG
325 TABLET ORAL
Status: DISCONTINUED | OUTPATIENT
Start: 2017-02-11 | End: 2017-02-15 | Stop reason: HOSPADM

## 2017-02-11 RX ADMIN — DOCUSATE SODIUM 100 MG: 100 CAPSULE, LIQUID FILLED ORAL at 08:08

## 2017-02-11 RX ADMIN — Medication 325 MG: at 10:32

## 2017-02-11 RX ADMIN — SIMETHICONE CHEW TAB 80 MG 80 MG: 80 TABLET ORAL at 13:14

## 2017-02-11 RX ADMIN — IBUPROFEN 600 MG: 600 TABLET ORAL at 03:48

## 2017-02-11 RX ADMIN — PRENATAL WITH FERROUS FUM AND FOLIC ACID 1 TABLET: 3080; 920; 120; 400; 22; 1.84; 3; 20; 10; 1; 12; 200; 27; 25; 2 TABLET ORAL at 08:08

## 2017-02-11 RX ADMIN — SIMETHICONE CHEW TAB 80 MG 80 MG: 80 TABLET ORAL at 23:15

## 2017-02-11 RX ADMIN — OXYCODONE AND ACETAMINOPHEN 2 TABLET: 5; 325 TABLET ORAL at 10:32

## 2017-02-11 RX ADMIN — OXYCODONE AND ACETAMINOPHEN 2 TABLET: 5; 325 TABLET ORAL at 23:15

## 2017-02-11 RX ADMIN — IBUPROFEN 600 MG: 600 TABLET ORAL at 23:15

## 2017-02-11 RX ADMIN — IBUPROFEN 600 MG: 600 TABLET ORAL at 10:32

## 2017-02-11 RX ADMIN — SIMETHICONE CHEW TAB 80 MG 80 MG: 80 TABLET ORAL at 08:08

## 2017-02-11 RX ADMIN — CEFAZOLIN SODIUM 2 G: 2 INJECTION, SOLUTION INTRAVENOUS at 05:25

## 2017-02-11 RX ADMIN — OXYCODONE AND ACETAMINOPHEN 2 TABLET: 5; 325 TABLET ORAL at 18:57

## 2017-02-11 RX ADMIN — SODIUM CHLORIDE, POTASSIUM CHLORIDE, SODIUM LACTATE AND CALCIUM CHLORIDE 125 ML/HR: 600; 310; 30; 20 INJECTION, SOLUTION INTRAVENOUS at 05:51

## 2017-02-11 RX ADMIN — OXYCODONE AND ACETAMINOPHEN 2 TABLET: 5; 325 TABLET ORAL at 03:48

## 2017-02-11 NOTE — PROGRESS NOTES
2017    Name:Enrique Moscoso    MR#:[id number]     PROGRESS NOTE:  Post-Op 1 S/P        Subjective   26 y.o. yo Female  s/p CS at 38w5d doing well. Pain well controlled. Normal GI function. Lochia normal. She hasn't voided yet ;she has taken in solid food. Incision dry     Patient Active Problem List   Diagnosis   • Pregnancy   • Morbid obesity due to excess calories        Objective    Vitals  Temp:  Temp:  [98.1 °F (36.7 °C)-98.5 °F (36.9 °C)] 98.2 °F (36.8 °C)  Temp src: Oral  BP:  BP: (101-173)/() 122/74  Pulse:  Heart Rate:  [] 100  RR:   Resp:  [16-20] 16    General Awake, alert, no distress  Abdomen Soft, non-distended, fundus firm, below umbilicus, appropriately tender  Incision  Intact, no erythema or exudate  Extremities Calves NT bilaterally     I/O last 3 completed shifts:  In: 4500 [I.V.:4500]  Out: 2350 [Urine:1350; Blood:1000]    LABS:   Lab Results   Component Value Date    WBC 12.68 (H) 2017    HGB 9.0 (L) 2017    HCT 27.9 (L) 2017    MCV 76.2 (L) 2017     2017       Infant: male       Assessment   1.  POD 1 from c/s  2 anemia       Plan: Doing well.              Add iron           Dion Gilbert MD  2017 9:13 AM

## 2017-02-11 NOTE — PLAN OF CARE
Problem: Postpartum ( Delivery) (Adult)  Goal: Signs and Symptoms of Listed Potential Problems Will be Absent or Manageable (Postpartum)  Outcome: Ongoing (interventions implemented as appropriate)  Pain controlled well this shift, no s/s of infection, V/S monitored this shift, bleeding WNL, incision without drainage

## 2017-02-11 NOTE — OP NOTE
Operative Note    Patient name: Enrique Moscoso  YOB: 1990   MRN: [id number]  Admission Date: 2/10/2017    ID: 26 y.o.  at 38w5d    Preoperative Diagnosis:   Patient Active Problem List   Diagnosis   • Pregnancy   • Morbid obesity due to excess calories       Postoperative Diagnosis: Same as above.    Procedure(s): primarylow transverse  delivery    Procedure(s):   SECTION PRIMARY    Surgeons: Surgeon(s) and Role:     * Dion Gilbert MD - Primary     * Fab Albert DO - Assisting    Assistant: Circulator: Yessy Moffett RN; Linda Mccoy RN  NICU Nurse: Payal Gonzalez RN  Baby Nurse: Inessa Drake RN  OB TECH: Sherri Jackson; Duarte Jennings     Anesthesia: Epidural    Estimated Blood Loss: 1000 mL mL    IV Fluids: [unfilled]    Preoperative antibiotic: cefazolin (Ancef)    Blood products:   Blood Administration Record     None          Pathology:   Order Name Source Comment Collection Info Order Time   URINE DRUG SCREEN Urine, Clean Catch  Collected By: Yessy Moffett RN 2/10/2017  7:26 PM       Drains: Onofre catheter to gravity clear urine    Complications: None    Condition: Stable to recovery room                                          Infant:                 Gender: male  infant    Weight: 6 lb 0.5 oz (2.736 kg)     Apgars:    @ 1 minute /        @ 5 minutes    Cord gases: Venous:  @BABYNOHDR(BRIEFLAB, PHCVEN, BECVEN)@     Arterial:  @BABYNOHDR(BRIEFLAB, PHCART, BECART)@       Operative Summary:   After obtaining informed consent the patient was taken to the operating room where adequate anesthesia was obtained.  Onofre catheter was placed to straight drainage.  IV antibiotics were given preoperatively.       The abdomen was prepped and draped in the usual sterile fashion.  With an  adequate level of anesthesia, a Pfannenstiel skin incision was made with the scalpel and carried through  the adipose layer to the fascia.  The fascia was incised in the midline  extended  laterally with the Valencia scissors and/or blunt dissection.       The upper aspect of the fascia was grasped with 2 Kocher clamps, elevated, and dissected off the underlying rectus muscles bluntly and with the Valencia scissors.  This was repeated on the inferior aspect of the fascia.  The peritoneum was entered bluntly.  Bladder blade and Centeno retractors were inserted .       The uterus was incised with the scalpel in a low transverse fashion  and extended manually.  Membranes were ruptured.  The infant was delivered atraumatically from left occiput posterior presentation.  The umbilical cord was clamped and cut and the nose and mouth bulb suctioned.  The infant was handed off to the delivery team.       The placenta was manually removed.  The uterus was exteriorized and  moist lap sponges were used to clean the uterine lining. The uterine incision was repaired with #1 chromic in a running locked fashion. Figure of eight stitches were used for hemostasis.      Adequate hemostasis was achieved.  The uterus, tubes and ovaries were noted to be normal.  The uterus was returned to the abdomen.  The gutters were cleared of all clots and debris.  The uterine incision remained hemostatic.   The fascia was closed with a running 0 PDS II.  The subcutaneous space was reapproximated using 0 chromic.  The skin was closed using stapler.  The patient was taken to the recovery room in stable condition.    Dion Gilbert MD

## 2017-02-11 NOTE — ANESTHESIA POSTPROCEDURE EVALUATION
Patient: Enrique Moscoso    Procedure Summary     Date Anesthesia Start Anesthesia Stop Room / Location    02/10/17 1415  [facility]       Procedure Diagnosis Surgeon Provider     SECTION PRIMARY (N/A Abdomen) No diagnosis on file. MD Aditi Dillon DO          Anesthesia Type: epidural  Last vitals  BP   101/51   Temp   98.1   Pulse  129   Resp   18   SpO2   99%     Post Anesthesia Care and Evaluation    Patient location during evaluation: bedside  Patient participation: complete - patient participated  Level of consciousness: awake  Pain score: 0  Pain management: satisfactory to patient  Airway patency: patent  Anesthetic complications: No anesthetic complications    Cardiovascular status: acceptable  Respiratory status: acceptable  Hydration status: acceptable

## 2017-02-12 PROBLEM — O16.9 SEVERE HYPERTENSION AFFECTING PREGNANCY: Status: ACTIVE | Noted: 2017-02-12

## 2017-02-12 LAB
ALP SERPL-CCNC: 91 U/L (ref 25–100)
ALT SERPL W P-5'-P-CCNC: 8 U/L (ref 7–40)
AST SERPL-CCNC: 16 U/L (ref 0–33)
BILIRUB SERPL-MCNC: 0.2 MG/DL (ref 0.3–1.2)
CREAT BLD-MCNC: 0.6 MG/DL (ref 0.6–1.3)
DEPRECATED RDW RBC AUTO: 51.2 FL (ref 37–54)
ERYTHROCYTE [DISTWIDTH] IN BLOOD BY AUTOMATED COUNT: 18.3 % (ref 11.3–14.5)
HCT VFR BLD AUTO: 27.9 % (ref 34.5–44)
HGB BLD-MCNC: 9 G/DL (ref 11.5–15.5)
LDH SERPL-CCNC: 214 U/L (ref 120–246)
MCH RBC QN AUTO: 25.3 PG (ref 27–31)
MCHC RBC AUTO-ENTMCNC: 32.3 G/DL (ref 32–36)
MCV RBC AUTO: 78.4 FL (ref 80–99)
PLATELET # BLD AUTO: 302 10*3/MM3 (ref 150–450)
PMV BLD AUTO: 10.5 FL (ref 6–12)
PROT UR-MCNC: 8 MG/DL (ref 1–14)
RBC # BLD AUTO: 3.56 10*6/MM3 (ref 3.89–5.14)
URATE SERPL-MCNC: 4.8 MG/DL (ref 3.1–7.8)
WBC NRBC COR # BLD: 10.73 10*3/MM3 (ref 3.5–10.8)

## 2017-02-12 PROCEDURE — 84460 ALANINE AMINO (ALT) (SGPT): CPT | Performed by: OBSTETRICS & GYNECOLOGY

## 2017-02-12 PROCEDURE — 82247 BILIRUBIN TOTAL: CPT | Performed by: OBSTETRICS & GYNECOLOGY

## 2017-02-12 PROCEDURE — 82565 ASSAY OF CREATININE: CPT | Performed by: OBSTETRICS & GYNECOLOGY

## 2017-02-12 PROCEDURE — 85027 COMPLETE CBC AUTOMATED: CPT | Performed by: OBSTETRICS & GYNECOLOGY

## 2017-02-12 PROCEDURE — 84075 ASSAY ALKALINE PHOSPHATASE: CPT | Performed by: OBSTETRICS & GYNECOLOGY

## 2017-02-12 PROCEDURE — 84550 ASSAY OF BLOOD/URIC ACID: CPT | Performed by: OBSTETRICS & GYNECOLOGY

## 2017-02-12 PROCEDURE — 84450 TRANSFERASE (AST) (SGOT): CPT | Performed by: OBSTETRICS & GYNECOLOGY

## 2017-02-12 PROCEDURE — 83615 LACTATE (LD) (LDH) ENZYME: CPT | Performed by: OBSTETRICS & GYNECOLOGY

## 2017-02-12 PROCEDURE — 25010000002 MAGNESIUM SULFATE IN LACTATED RINGER'S 40 GM/580ML SOLUTION: Performed by: OBSTETRICS & GYNECOLOGY

## 2017-02-12 PROCEDURE — 84156 ASSAY OF PROTEIN URINE: CPT | Performed by: OBSTETRICS & GYNECOLOGY

## 2017-02-12 PROCEDURE — 99231 SBSQ HOSP IP/OBS SF/LOW 25: CPT | Performed by: OBSTETRICS & GYNECOLOGY

## 2017-02-12 RX ORDER — LABETALOL HYDROCHLORIDE 5 MG/ML
40 INJECTION, SOLUTION INTRAVENOUS AS NEEDED
Status: DISPENSED | OUTPATIENT
Start: 2017-02-12 | End: 2017-02-13

## 2017-02-12 RX ORDER — FAMOTIDINE 20 MG/1
20 TABLET, FILM COATED ORAL 2 TIMES DAILY
Status: CANCELLED | OUTPATIENT
Start: 2017-02-12

## 2017-02-12 RX ORDER — SODIUM CHLORIDE 0.9 % (FLUSH) 0.9 %
1-10 SYRINGE (ML) INJECTION AS NEEDED
Status: CANCELLED | OUTPATIENT
Start: 2017-02-12

## 2017-02-12 RX ORDER — MAGNESIUM SULFATE IN WATER 40 MG/ML
2 INJECTION, SOLUTION INTRAVENOUS
Status: DISPENSED | OUTPATIENT
Start: 2017-02-12 | End: 2017-02-13

## 2017-02-12 RX ORDER — ZOLPIDEM TARTRATE 5 MG/1
5 TABLET ORAL NIGHTLY PRN
Status: CANCELLED | OUTPATIENT
Start: 2017-02-12 | End: 2017-02-22

## 2017-02-12 RX ORDER — LABETALOL HYDROCHLORIDE 5 MG/ML
20 INJECTION, SOLUTION INTRAVENOUS AS NEEDED
Status: DISPENSED | OUTPATIENT
Start: 2017-02-12 | End: 2017-02-13

## 2017-02-12 RX ORDER — ACETAMINOPHEN 325 MG/1
650 TABLET ORAL EVERY 4 HOURS PRN
Status: CANCELLED | OUTPATIENT
Start: 2017-02-12

## 2017-02-12 RX ORDER — LIDOCAINE HYDROCHLORIDE 10 MG/ML
5 INJECTION, SOLUTION INFILTRATION; PERINEURAL AS NEEDED
Status: CANCELLED | OUTPATIENT
Start: 2017-02-12

## 2017-02-12 RX ORDER — LABETALOL HYDROCHLORIDE 5 MG/ML
80 INJECTION, SOLUTION INTRAVENOUS AS NEEDED
Status: ACTIVE | OUTPATIENT
Start: 2017-02-12 | End: 2017-02-13

## 2017-02-12 RX ORDER — NIFEDIPINE 60 MG/1
60 TABLET, EXTENDED RELEASE ORAL
Status: DISCONTINUED | OUTPATIENT
Start: 2017-02-12 | End: 2017-02-13

## 2017-02-12 RX ADMIN — LABETALOL HYDROCHLORIDE 20 MG: 5 INJECTION, SOLUTION INTRAVENOUS at 18:28

## 2017-02-12 RX ADMIN — LABETALOL HYDROCHLORIDE 40 MG: 5 INJECTION, SOLUTION INTRAVENOUS at 16:18

## 2017-02-12 RX ADMIN — OXYCODONE AND ACETAMINOPHEN 2 TABLET: 5; 325 TABLET ORAL at 18:28

## 2017-02-12 RX ADMIN — Medication 2 G/HR: at 15:34

## 2017-02-12 RX ADMIN — SODIUM CHLORIDE, POTASSIUM CHLORIDE, SODIUM LACTATE AND CALCIUM CHLORIDE 96 ML/HR: 600; 310; 30; 20 INJECTION, SOLUTION INTRAVENOUS at 15:33

## 2017-02-12 RX ADMIN — IBUPROFEN 600 MG: 600 TABLET ORAL at 05:33

## 2017-02-12 RX ADMIN — LABETALOL HYDROCHLORIDE 20 MG: 5 INJECTION, SOLUTION INTRAVENOUS at 15:23

## 2017-02-12 RX ADMIN — OXYCODONE AND ACETAMINOPHEN 2 TABLET: 5; 325 TABLET ORAL at 05:33

## 2017-02-12 RX ADMIN — LABETALOL HYDROCHLORIDE 20 MG: 5 INJECTION, SOLUTION INTRAVENOUS at 15:58

## 2017-02-12 RX ADMIN — DOCUSATE SODIUM 100 MG: 100 CAPSULE, LIQUID FILLED ORAL at 19:56

## 2017-02-12 RX ADMIN — NIFEDIPINE 60 MG: 60 TABLET, FILM COATED, EXTENDED RELEASE ORAL at 16:10

## 2017-02-12 RX ADMIN — LABETALOL HYDROCHLORIDE 40 MG: 5 INJECTION, SOLUTION INTRAVENOUS at 18:57

## 2017-02-12 RX ADMIN — SIMETHICONE CHEW TAB 80 MG 80 MG: 80 TABLET ORAL at 19:56

## 2017-02-12 RX ADMIN — OXYCODONE AND ACETAMINOPHEN 2 TABLET: 5; 325 TABLET ORAL at 14:03

## 2017-02-12 NOTE — PROGRESS NOTES
[facility]   PROGRESS NOTE    Post-Op Day 2 S/P   Subjective   Subjective:   Diet: Adequate intake.    Activity level: Returning to normal.    Nurse called with increased BP since noon.  Objective    Objective     Vitals: Vital Signs Range for the last 24 hours  Temperature: Temp:  [97.7 °F (36.5 °C)-98.5 °F (36.9 °C)] 98.3 °F (36.8 °C)   Temp Source: Temp src: Oral   BP: BP: (127-173)/() 173/113   Pulse: Heart Rate:  [] 106   Respirations: Resp:  [14-18] 18   SPO2:     O2 Amount (l/min):     O2 Devices     Weight:              Physical Exam    Results for BRINA MOSCOSO (MRN [id number]) as of 2017 15:14   Ref. Range 2017 13:50   External LDH Latest Ref Range: 120 - 246 U/L 214   Creatinine Latest Ref Range: 0.60 - 1.30 mg/dL 0.60   Alkaline Phosphatase Latest Ref Range: 25 - 100 U/L 91   Uric Acid Latest Ref Range: 3.1 - 7.8 mg/dL 4.8   ALT (SGPT) Latest Ref Range: 7 - 40 U/L 8   AST (SGOT) Latest Ref Range: 0 - 33 U/L 16   Total Bilirubin Latest Ref Range: 0.3 - 1.2 mg/dL 0.2 (L)   WBC Latest Ref Range: 3.50 - 10.80 10*3/mm3 10.73   RBC Latest Ref Range: 3.89 - 5.14 10*6/mm3 3.56 (L)   Hemoglobin Latest Ref Range: 11.5 - 15.5 g/dL 9.0 (L)   Hematocrit Latest Ref Range: 34.5 - 44.0 % 27.9 (L)   RDW Latest Ref Range: 11.3 - 14.5 % 18.3 (H)   MCV Latest Ref Range: 80.0 - 99.0 fL 78.4 (L)   MCH Latest Ref Range: 27.0 - 31.0 pg 25.3 (L)   MCHC Latest Ref Range: 32.0 - 36.0 g/dL 32.3   MPV Latest Ref Range: 6.0 - 12.0 fL 10.5   Platelets Latest Ref Range: 150 - 450 10*3/mm3 302   RDW-SD Latest Ref Range: 37.0 - 54.0 fl 51.2       Assessment/Plan      Active Problems:    Pregnancy    Severe hypertension affecting pregnancy    Assessment & Plan    Assessment:    Brina Moscoso is Day 2  post-partum  , Low Transverse    .  Severe hypertension in postpartum period with GHTN.    Plan:  transfer to [facility] for antihypertensives and magnesium sulfate.      Tonia Pickett,  MD  02/12/17  3:12 PM

## 2017-02-12 NOTE — PLAN OF CARE
Problem: Patient Care Overview (Adult)  Goal: Plan of Care Review  Outcome: Ongoing (interventions implemented as appropriate)    17 1638 17   Patient Care Overview   Progress --  progress toward functional goals as expected   Coping/Psychosocial Response Interventions   Plan Of Care Reviewed With patient --        Goal: Adult Individualization and Mutuality  Outcome: Ongoing (interventions implemented as appropriate)    17   Individualization   Patient Specific Goals Decreased BP, pain controlled       Goal: Discharge Needs Assessment  Outcome: Ongoing (interventions implemented as appropriate)    17   Discharge Needs Assessment   Concerns To Be Addressed no discharge needs identified   Readmission Within The Last 30 Days no previous admission in last 30 days   Current Health   Anticipated Changes Related to Illness none   Self-Care   Equipment Currently Used at Home none         Problem: Postpartum ( Delivery) (Adult)  Goal: Signs and Symptoms of Listed Potential Problems Will be Absent or Manageable (Postpartum)  Outcome: Ongoing (interventions implemented as appropriate)    Problem: Hypertensive Disorders in Pregnancy (Adult,Obstetrics,Pediatric)  Goal: Signs and Symptoms of Listed Potential Problems Will be Absent or Manageable (Hypertensive Disorders in Pregnancy)  Outcome: Ongoing (interventions implemented as appropriate)    17   Hypertensive Disorders in Pregnancy   Problems Assessed (Hypertensive Disorders in Pregnancy) all   Problems Present (Hypertensive Disorders in Pregnancy) none

## 2017-02-12 NOTE — PROGRESS NOTES
2017    Name:Enrique Moscoso    MR#:[id number]     PROGRESS NOTE:  Post-Op 2 S/P        Subjective   26 y.o. yo Female  s/p CS at 38w5d doing well. Pain well controlled. Normal GI function, passing flatus. Lochia normal. Ambulating and voiding. C/o pain when dressing removed    Patient Active Problem List   Diagnosis   • Pregnancy   • Morbid obesity due to excess calories        Objective    Vitals  Temp:  Temp:  [97.7 °F (36.5 °C)-98.5 °F (36.9 °C)] 97.7 °F (36.5 °C)  Temp src: Oral  BP:  BP: (127-146)/(65-95) 143/91  Pulse:  Heart Rate:  [] 91  RR:   Resp:  [14-18] 18    General Awake, alert, no distress  Abdomen Soft, non-distended, fundus firm, below umbilicus, appropriately tender  Incision  Intact, no erythema or exudate  Extremities Calves NT bilaterally     I/O last 3 completed shifts:  In: 3500 [I.V.:3500]  Out: 2650 [Urine:1650; Blood:1000]    LABS:   Lab Results   Component Value Date    WBC 12.68 (H) 2017    HGB 9.0 (L) 2017    HCT 27.9 (L) 2017    MCV 76.2 (L) 2017     2017       Infant: male   Circumcision done today    Assessment   1.  POD 2 from c/s    Plan: home tomorrow      Dion Gilbert MD  2017 9:57 AM

## 2017-02-13 LAB — REF LAB TEST METHOD: NORMAL

## 2017-02-13 PROCEDURE — 99231 SBSQ HOSP IP/OBS SF/LOW 25: CPT | Performed by: OBSTETRICS & GYNECOLOGY

## 2017-02-13 PROCEDURE — 25010000002 MAGNESIUM SULFATE IN LACTATED RINGER'S 40 GM/580ML SOLUTION: Performed by: OBSTETRICS & GYNECOLOGY

## 2017-02-13 RX ORDER — MISOPROSTOL 200 UG/1
800 TABLET ORAL AS NEEDED
Status: DISCONTINUED | OUTPATIENT
Start: 2017-02-13 | End: 2017-02-15 | Stop reason: HOSPADM

## 2017-02-13 RX ORDER — CARBOPROST TROMETHAMINE 250 UG/ML
250 INJECTION, SOLUTION INTRAMUSCULAR AS NEEDED
Status: DISCONTINUED | OUTPATIENT
Start: 2017-02-13 | End: 2017-02-15 | Stop reason: HOSPADM

## 2017-02-13 RX ORDER — LABETALOL 200 MG/1
200 TABLET, FILM COATED ORAL EVERY 12 HOURS SCHEDULED
Status: DISCONTINUED | OUTPATIENT
Start: 2017-02-13 | End: 2017-02-15 | Stop reason: HOSPADM

## 2017-02-13 RX ORDER — BUTALBITAL, ACETAMINOPHEN AND CAFFEINE 50; 325; 40 MG/1; MG/1; MG/1
1 TABLET ORAL EVERY 4 HOURS PRN
Status: DISCONTINUED | OUTPATIENT
Start: 2017-02-13 | End: 2017-02-13

## 2017-02-13 RX ORDER — METHYLERGONOVINE MALEATE 0.2 MG/ML
200 INJECTION INTRAVENOUS ONCE AS NEEDED
Status: DISCONTINUED | OUTPATIENT
Start: 2017-02-13 | End: 2017-02-15 | Stop reason: HOSPADM

## 2017-02-13 RX ADMIN — OXYCODONE AND ACETAMINOPHEN 2 TABLET: 5; 325 TABLET ORAL at 01:00

## 2017-02-13 RX ADMIN — Medication 325 MG: at 09:10

## 2017-02-13 RX ADMIN — OXYCODONE AND ACETAMINOPHEN 2 TABLET: 5; 325 TABLET ORAL at 19:57

## 2017-02-13 RX ADMIN — SODIUM CHLORIDE, POTASSIUM CHLORIDE, SODIUM LACTATE AND CALCIUM CHLORIDE 96 ML/HR: 600; 310; 30; 20 INJECTION, SOLUTION INTRAVENOUS at 01:12

## 2017-02-13 RX ADMIN — Medication 2 G/HR: at 10:21

## 2017-02-13 RX ADMIN — NIFEDIPINE 60 MG: 60 TABLET, FILM COATED, EXTENDED RELEASE ORAL at 09:10

## 2017-02-13 RX ADMIN — OXYCODONE AND ACETAMINOPHEN 2 TABLET: 5; 325 TABLET ORAL at 05:52

## 2017-02-13 RX ADMIN — LABETALOL HYDROCHLORIDE 200 MG: 200 TABLET, FILM COATED ORAL at 13:31

## 2017-02-13 RX ADMIN — ACETAMINOPHEN 650 MG: 325 TABLET, FILM COATED ORAL at 13:31

## 2017-02-13 RX ADMIN — LABETALOL HYDROCHLORIDE 200 MG: 200 TABLET, FILM COATED ORAL at 21:24

## 2017-02-13 RX ADMIN — OXYCODONE AND ACETAMINOPHEN 2 TABLET: 5; 325 TABLET ORAL at 10:21

## 2017-02-13 NOTE — PLAN OF CARE
Problem: Patient Care Overview (Adult)  Goal: Plan of Care Review  Outcome: Ongoing (interventions implemented as appropriate)    17 0524   Patient Care Overview   Progress progress toward functional goals as expected   Coping/Psychosocial Response Interventions   Plan Of Care Reviewed With patient;sibling       Goal: Adult Individualization and Mutuality  Outcome: Ongoing (interventions implemented as appropriate)  Goal: Discharge Needs Assessment  Outcome: Ongoing (interventions implemented as appropriate)    Problem: Postpartum ( Delivery) (Adult)  Goal: Signs and Symptoms of Listed Potential Problems Will be Absent or Manageable (Postpartum)  Outcome: Ongoing (interventions implemented as appropriate)    Problem: Hypertensive Disorders in Pregnancy (Adult,Obstetrics,Pediatric)  Goal: Signs and Symptoms of Listed Potential Problems Will be Absent or Manageable (Hypertensive Disorders in Pregnancy)  Outcome: Ongoing (interventions implemented as appropriate)

## 2017-02-13 NOTE — PROGRESS NOTES
[place]   PROGRESS NOTE    Post-Op Day 3 S/P   Subjective     Patient reports:  Pain is  controlled with narcotic analgesics including oxycodone/acetaminophen (Percocet, Tylox).  She is  ambulating. Tolerating diet. Tolerating po -- normal for solids.  Intake -- c/o of tolerating po solids.   Voiding - without difficulty; flatus reported..  Vaginal bleeding is as much as expected. Patient was started on MagSO4 for severe hypertension. Pt was also started on Procardia XL 60 mg qd. Pt developed a severe headache after the Mag and procardia were started. Headache is getting worse with Percocet.      Objective      Vitals: Vital Signs Range for the last 24 hours  Temperature: Temp:  [97.4 °F (36.3 °C)-98.6 °F (37 °C)] 97.5 °F (36.4 °C)   Temp Source: Temp src: Oral   BP: BP: (131-173)/() 149/83   Pulse: Heart Rate:  [] 99   Respirations: Resp:  [16-20] 16   SPO2:     O2 Amount (l/min):     O2 Devices     Weight:              Physical Exam    Lungs clear to auscultation bilaterally   Abdomen Soft, non-tender, normal bowel sounds; no bruits, organomegaly or masses.   Incision  healing well   Extremities extremities normal, atraumatic, no cyanosis or edema     I reviewed the patient's new clinical results.    Assessment/Plan      Active Problems:    Pregnancy    Severe hypertension affecting pregnancy      Assessment:    Enrique Moscoso is Day 3  post-partum  , Low Transverse     postpartum severe preeclampsia.                   Plan:  D/C the MagSO4 today, Replace Procardia with Labetalol 200 mg bid, and obtain Neurology consult if Headache does not improve.        Bhavesh Armendariz MD  2017  12:43 PM

## 2017-02-13 NOTE — PLAN OF CARE
Problem: Patient Care Overview (Adult)  Goal: Plan of Care Review  Outcome: Ongoing (interventions implemented as appropriate)    17 0524 17 0815   Patient Care Overview   Progress progress toward functional goals as expected --    Coping/Psychosocial Response Interventions   Plan Of Care Reviewed With --  patient       Goal: Adult Individualization and Mutuality  Outcome: Ongoing (interventions implemented as appropriate)    17 1659   Individualization   Patient Specific Goals Decreased BP, pain controlled       Goal: Discharge Needs Assessment  Outcome: Ongoing (interventions implemented as appropriate)    17   Discharge Needs Assessment   Concerns To Be Addressed no discharge needs identified --    Readmission Within The Last 30 Days no previous admission in last 30 days --    Equipment Needed After Discharge --  none   Current Health   Anticipated Changes Related to Illness none --    Self-Care   Equipment Currently Used at Home none --          Problem: Postpartum ( Delivery) (Adult)  Goal: Signs and Symptoms of Listed Potential Problems Will be Absent or Manageable (Postpartum)  Outcome: Ongoing (interventions implemented as appropriate)    17 0815   Postpartum ( Delivery)   Problems Assessed (Postpartum ) all   Problems Present (Postpartum ) pain         Problem: Hypertensive Disorders in Pregnancy (Adult,Obstetrics,Pediatric)  Goal: Signs and Symptoms of Listed Potential Problems Will be Absent or Manageable (Hypertensive Disorders in Pregnancy)  Outcome: Ongoing (interventions implemented as appropriate)    17 0815   Hypertensive Disorders in Pregnancy   Problems Assessed (Hypertensive Disorders in Pregnancy) all   Problems Present (Hypertensive Disorders in Pregnancy) none

## 2017-02-14 PROCEDURE — 99231 SBSQ HOSP IP/OBS SF/LOW 25: CPT | Performed by: OBSTETRICS & GYNECOLOGY

## 2017-02-14 RX ADMIN — LABETALOL HYDROCHLORIDE 200 MG: 200 TABLET, FILM COATED ORAL at 20:53

## 2017-02-14 RX ADMIN — PRENATAL WITH FERROUS FUM AND FOLIC ACID 1 TABLET: 3080; 920; 120; 400; 22; 1.84; 3; 20; 10; 1; 12; 200; 27; 25; 2 TABLET ORAL at 07:36

## 2017-02-14 RX ADMIN — SIMETHICONE CHEW TAB 80 MG 80 MG: 80 TABLET ORAL at 07:36

## 2017-02-14 RX ADMIN — Medication 325 MG: at 07:36

## 2017-02-14 RX ADMIN — OXYCODONE AND ACETAMINOPHEN 2 TABLET: 5; 325 TABLET ORAL at 19:40

## 2017-02-14 RX ADMIN — DOCUSATE SODIUM 100 MG: 100 CAPSULE, LIQUID FILLED ORAL at 07:36

## 2017-02-14 RX ADMIN — OXYCODONE AND ACETAMINOPHEN 2 TABLET: 5; 325 TABLET ORAL at 11:28

## 2017-02-14 RX ADMIN — SIMETHICONE CHEW TAB 80 MG 80 MG: 80 TABLET ORAL at 11:27

## 2017-02-14 RX ADMIN — OXYCODONE AND ACETAMINOPHEN 2 TABLET: 5; 325 TABLET ORAL at 02:56

## 2017-02-14 RX ADMIN — IBUPROFEN 600 MG: 600 TABLET ORAL at 11:27

## 2017-02-14 RX ADMIN — LABETALOL HYDROCHLORIDE 200 MG: 200 TABLET, FILM COATED ORAL at 07:37

## 2017-02-14 NOTE — PLAN OF CARE
Problem: Patient Care Overview (Adult)  Goal: Plan of Care Review  Outcome: Ongoing (interventions implemented as appropriate)    17 1501   Outcome Evaluation   Outcome Summary/Follow up Plan LTV INCISION WITH STAPLES & INTERDRY. PAIN CONTROLLED. PLANNING FOR DC TOMORROW.       Goal: Adult Individualization and Mutuality  Outcome: Ongoing (interventions implemented as appropriate)  Goal: Discharge Needs Assessment  Outcome: Ongoing (interventions implemented as appropriate)    Problem: Postpartum ( Delivery) (Adult)  Goal: Signs and Symptoms of Listed Potential Problems Will be Absent or Manageable (Postpartum)  Outcome: Ongoing (interventions implemented as appropriate)

## 2017-02-14 NOTE — PROGRESS NOTES
[facility]   PROGRESS NOTE    Post-Op Day 4 S/P   Subjective     Patient reports:  Pain is fairly well controlled with narcotic analgesics including oxycodone/acetaminophen (Percocet, Tylox).  She is  ambulating. Tolerating a regular diet. .   Voiding - without difficulty; passing flatus but has not had a BM.  Vaginal bleeding is as much as expected. No headache today, patient wants to go home tomorrow if headache does not return.      Objective      Vitals: Vital Signs Range for the last 24 hours  Temperature: Temp:  [97.8 °F (36.6 °C)-98.5 °F (36.9 °C)] 98.5 °F (36.9 °C)   Temp Source: Temp src: Oral   BP: BP: (114-144)/(66-84) 137/84   Pulse: Heart Rate:  [] 103   Respirations: Resp:  [16-18] 18   SPO2:     O2 Amount (l/min):     O2 Devices     Weight:              Physical Exam    Lungs clear to auscultation bilaterally   Abdomen Soft, non-tender, normal bowel sounds; no bruits, organomegaly or masses.   Incision  healing well, clips are still in place   Extremities extremities normal, atraumatic, no cyanosis or edema     None    Assessment/Plan      Active Problems:    Pregnancy    Severe hypertension affecting pregnancy      Assessment:    Enrique Moscoso is Day 4  post-partum  , Low Transverse    .       Plan:  Remove IV and discharge tomorrow if BP remains controlled.        Bhavesh Armendariz MD  2017  1:25 PM

## 2017-02-14 NOTE — PLAN OF CARE
Problem: Patient Care Overview (Adult)  Goal: Plan of Care Review  Outcome: Ongoing (interventions implemented as appropriate)    17 05   Patient Care Overview   Progress improving   Coping/Psychosocial Response Interventions   Plan Of Care Reviewed With patient       Goal: Adult Individualization and Mutuality  Outcome: Ongoing (interventions implemented as appropriate)    17 1659 17 05   Individualization   Patient Specific Preferences --  prefers to bottlefeed   Patient Specific Goals Decreased BP, pain controlled --    Patient Specific Interventions --  check if pt needs pain meds q 4 hrs       Goal: Discharge Needs Assessment  Outcome: Ongoing (interventions implemented as appropriate)    17   Discharge Needs Assessment   Concerns To Be Addressed no discharge needs identified   Readmission Within The Last 30 Days no previous admission in last 30 days         Problem: Postpartum ( Delivery) (Adult)  Goal: Signs and Symptoms of Listed Potential Problems Will be Absent or Manageable (Postpartum)  Outcome: Ongoing (interventions implemented as appropriate)    17   Postpartum ( Delivery)   Problems Assessed (Postpartum ) all   Problems Present (Postpartum ) pain         Problem: Hypertensive Disorders in Pregnancy (Adult,Obstetrics,Pediatric)  Goal: Signs and Symptoms of Listed Potential Problems Will be Absent or Manageable (Hypertensive Disorders in Pregnancy)  Outcome: Ongoing (interventions implemented as appropriate)    17 05   Hypertensive Disorders in Pregnancy   Problems Assessed (Hypertensive Disorders in Pregnancy) all   Problems Present (Hypertensive Disorders in Pregnancy) none

## 2017-02-15 VITALS
WEIGHT: 240 LBS | SYSTOLIC BLOOD PRESSURE: 140 MMHG | RESPIRATION RATE: 16 BRPM | HEIGHT: 65 IN | BODY MASS INDEX: 39.99 KG/M2 | DIASTOLIC BLOOD PRESSURE: 79 MMHG | OXYGEN SATURATION: 99 % | TEMPERATURE: 98.4 F | HEART RATE: 86 BPM

## 2017-02-15 PROBLEM — Z34.90 PREGNANCY: Status: RESOLVED | Noted: 2017-01-21 | Resolved: 2017-02-15

## 2017-02-15 PROBLEM — O16.9 SEVERE HYPERTENSION AFFECTING PREGNANCY: Status: RESOLVED | Noted: 2017-02-12 | Resolved: 2017-02-15

## 2017-02-15 PROCEDURE — 99238 HOSP IP/OBS DSCHRG MGMT 30/<: CPT | Performed by: OBSTETRICS & GYNECOLOGY

## 2017-02-15 RX ORDER — PSEUDOEPHEDRINE HCL 30 MG
100 TABLET ORAL 2 TIMES DAILY PRN
Qty: 60 CAPSULE | Refills: 2 | Status: SHIPPED | OUTPATIENT
Start: 2017-02-15

## 2017-02-15 RX ORDER — OXYCODONE HYDROCHLORIDE AND ACETAMINOPHEN 5; 325 MG/1; MG/1
1 TABLET ORAL EVERY 4 HOURS PRN
Qty: 30 TABLET | Refills: 0 | Status: SHIPPED | OUTPATIENT
Start: 2017-02-15 | End: 2017-02-21

## 2017-02-15 RX ORDER — LABETALOL 200 MG/1
200 TABLET, FILM COATED ORAL EVERY 12 HOURS SCHEDULED
Qty: 60 TABLET | Refills: 5 | Status: SHIPPED | OUTPATIENT
Start: 2017-02-15

## 2017-02-15 RX ADMIN — SIMETHICONE CHEW TAB 80 MG 80 MG: 80 TABLET ORAL at 11:56

## 2017-02-15 RX ADMIN — OXYCODONE AND ACETAMINOPHEN 2 TABLET: 5; 325 TABLET ORAL at 11:56

## 2017-02-15 RX ADMIN — SIMETHICONE CHEW TAB 80 MG 80 MG: 80 TABLET ORAL at 07:50

## 2017-02-15 RX ADMIN — PRENATAL WITH FERROUS FUM AND FOLIC ACID 1 TABLET: 3080; 920; 120; 400; 22; 1.84; 3; 20; 10; 1; 12; 200; 27; 25; 2 TABLET ORAL at 07:51

## 2017-02-15 RX ADMIN — DOCUSATE SODIUM 100 MG: 100 CAPSULE, LIQUID FILLED ORAL at 07:51

## 2017-02-15 RX ADMIN — LABETALOL HYDROCHLORIDE 200 MG: 200 TABLET, FILM COATED ORAL at 07:50

## 2017-02-15 RX ADMIN — Medication 325 MG: at 07:50

## 2017-02-15 RX ADMIN — OXYCODONE AND ACETAMINOPHEN 2 TABLET: 5; 325 TABLET ORAL at 07:49

## 2017-02-15 RX ADMIN — OXYCODONE AND ACETAMINOPHEN 2 TABLET: 5; 325 TABLET ORAL at 03:31

## 2017-02-15 RX ADMIN — IBUPROFEN 600 MG: 600 TABLET ORAL at 07:50

## 2017-02-15 NOTE — PLAN OF CARE
Problem: Patient Care Overview (Adult)  Goal: Plan of Care Review  Outcome: Ongoing (interventions implemented as appropriate)    02/15/17 0418   Patient Care Overview   Progress improving   Coping/Psychosocial Response Interventions   Plan Of Care Reviewed With patient       Goal: Adult Individualization and Mutuality  Outcome: Ongoing (interventions implemented as appropriate)    17 1659 17 0531   Individualization   Patient Specific Preferences --  prefers to bottlefeed   Patient Specific Goals Decreased BP, pain controlled --    Patient Specific Interventions --  check if pt needs pain meds q 4 hrs       Goal: Discharge Needs Assessment  Outcome: Ongoing (interventions implemented as appropriate)    Problem: Postpartum ( Delivery) (Adult)  Goal: Signs and Symptoms of Listed Potential Problems Will be Absent or Manageable (Postpartum)  Outcome: Ongoing (interventions implemented as appropriate)    02/15/17 0418   Postpartum ( Delivery)   Problems Assessed (Postpartum ) all   Problems Present (Postpartum ) none

## 2017-02-15 NOTE — DISCHARGE SUMMARY
[facility] [place]   section Discharge Summary    Date of Admission: 2/10/2017  Date of Discharge:  2/15/2017      Patient: Enrique Moscoso      MR#:[id number]    Surgeon/OB: Dion Gilbert MD    Discharge Diagnosis:  section at 38w5d, pt was treated with MagSO4 on day 2 post op and discharged on Labetalol 200 mg bid    Procedures:  , Low Transverse     2/10/2017    7:01 PM      Anesthesia:  Epidural     Presenting Problem/History of Present Illness  Pregnancy [Z33.1]  Severe hypertension affecting pregnancy [O16.9]     Patient Active Problem List   Diagnosis   • Morbid obesity due to excess calories       Hospital Course  Patient is a 26 y.o. female  at 38w5d status post  section with uneventful postoperative recovery.  Patient was advanced to regular diet on postoperative day#1.  On discharge, ambulating, tolerating a regular diet without any difficulties and her incision is dry, clean and intact.     Infant:   male  fetus 6 lb 0.5 oz (2.736 kg)  with Apgar scores of 8  , 9   at five minutes.    Condition on Discharge:  Stable    Vital Signs  Temp:  [97.4 °F (36.3 °C)-98.4 °F (36.9 °C)] 98.4 °F (36.9 °C)  Heart Rate:  [80-98] 86  Resp:  [16-18] 16  BP: (131-145)/(78-94) 140/79    Lab Results   Component Value Date    WBC 10.73 2017    HGB 9.0 (L) 2017    HCT 27.9 (L) 2017    MCV 78.4 (L) 2017     2017     Patient is O positive, Rubella immune and Hep B, C negative    Discharge Disposition  Home or Self Care    Discharge Medications   Enrique Moscoso   Home Medication Instructions MELISA:[id number]    Printed on:02/15/17 9321   Medication Information                      acetaminophen (TYLENOL) 325 MG tablet  Take 500 mg by mouth.             docusate sodium 100 MG capsule  Take 100 mg by mouth 2 (Two) Times a Day As Needed for constipation.             folic acid (FOLVITE) 1 MG tablet  Take 1 tablet by mouth daily.              labetalol (NORMODYNE) 200 MG tablet  Take 1 tablet by mouth Every 12 (Twelve) Hours.             oxyCODONE-acetaminophen (PERCOCET) 5-325 MG per tablet  Take 1 tablet by mouth Every 4 (Four) Hours As Needed for moderate pain (4-6) for up to 6 days.             PRENATAL VIT W/ FE BISG-FA PO  Take 1 tablet by mouth daily.             promethazine (PHENERGAN) 25 MG tablet  take 1/2 to 1 tablet by mouth every 6 hours if needed for nausea                 Discharge Diet:   Diet Instructions     Advance Diet As Tolerated                     Activity at Discharge:   Activity Instructions     Discharge Activity Restrictions       1) No driving for 2 weeks and no longer taking narcotics.   2) Return to school / work in 6 weeks.  3) May shower   4) Do not lift / push / pull more then 15 lbs.       Pelvic Rest       Number of days:  30                 Follow-up Appointments  No future appointments.  Referrals and Follow-ups to Schedule     Call MD With Problems / Concerns    As directed    Instructions: Call for temp > 100 or profuse vaginal bleeding       Follow-Up    As directed    Make an appointment for 2 weeks and 6 weeks                 Bhavesh Armendariz MD     Dict No is [id number]

## 2017-02-16 NOTE — DISCHARGE SUMMARY
DATE OF ADMISSION:  02/10/2017  DATE OF DISCHARGE:   02/15/2017    PRIMARY CARE PHYSICIAN: None.     FINAL DIAGNOSES:  1.  Intrauterine pregnancy at 38 weeks.   2.  Severe gestational hypertension.   3.  Fetal intolerance to labor.     PROCEDURES:  1.  Primary  section.   2.  Epidural anesthesia.     CLINICAL HISTORY: The patient is a 26-year-old female,  1, para 0, who presented to the hospital in labor. The patients prenatal course has been complicated by hypertension. The patient proceeded in active labor and was given an epidural anesthetic and at that time had fetal heart tone changes and was taken to the operating room for a primary  section for fetal intolerance to labor.     PAST MEDICAL HISTORY:  Gestational hypertension.     PAST SURGICAL HISTORY:  None.      ALLERGIES: No known drug allergies.     CURRENT MEDICATION:  Prenatal vitamins.     REVIEW OF SYSTEMS: Reviewed and noncontributory.     SOCIAL HISTORY: Reviewed and noncontributory.     PHYSICAL EXAMINATION:  GENERAL: A well-developed, well-nourished, black female in active labor.   HEENT:  Within normal limits.  CHEST:  Within normal limits.  CARDIOVASCULAR: Within normal limits.   ABDOMEN: Gravid uterus, xiphoid minus 3.   EXTREMITIES:  There is 1+ edema and reflexes were 2+ bilaterally.     DIAGNOSTIC DATA: See chart.  The patient is Rh positive, rubella immune, and hepatitis B and C negative.      HOSPITAL COURSE: The patient was taken to the operating room where she underwent a primary  section under epidural anesthesia. She delivered a 6 pound, 5 ounce male infant with Apgars of 8 at one minute and 9 at five minutes. Postdelivery, the patient did well remaining afebrile throughout her hospital course. The patient was tolerating a regular diet on her 1st postoperative day.  However, on the 2nd postoperative day. The patient's blood pressure increased to 173/113. The patient was then taken to the antepartum unit  where she was started on magnesium sulfate 4 g bolus, 2 g/h and continued on this for approximately 28 hours. The patient was started on Procardia 60 mg p.o. daily, but developed a severe headache late in the 24-hour period.  The Procardia was discontinued. The patient was then restarted on labetalol 200 mg p.o. twice a day. The patient's blood pressure remained in the high normal range off the magnesium sulfate and the patient was ready for discharge on her 5th hospital day.     CONDITION: Satisfactory.  The patient's incision appeared to be healing well and the clips were removed and replaced with Steri-Strips.  At the time of discharge, she was able to care for herself and her infant.    FOLLOWUP:  She was given an appointment to see Dr. Moncada in 2 weeks and 6 weeks.     DISCHARGE MEDICATIONS:  1.  Percocet.  2.  Labetalol.  3.  Prenatal vitamins.  4.  Colace.          Bhavesh Moncada MD  GAUDENCIO/tf  DD: 02/15/2017 15:52:16  DT: 02/15/2017 16:16:30  Voice Rec. ID #[id number]  Voice Original ID #[id number]  Doc ID #[id number]  Rev. #1 (pcp)  cc:

## 2025-02-28 ENCOUNTER — HOSPITAL ENCOUNTER (EMERGENCY)
Facility: HOSPITAL | Age: 35
Discharge: HOME | End: 2025-03-01
Attending: EMERGENCY MEDICINE
Payer: COMMERCIAL

## 2025-02-28 ENCOUNTER — APPOINTMENT (OUTPATIENT)
Dept: ULTRASOUND IMAGING | Facility: HOSPITAL | Age: 35
End: 2025-02-28
Payer: COMMERCIAL

## 2025-02-28 ENCOUNTER — HOSPITAL ENCOUNTER (EMERGENCY)
Facility: HOSPITAL | Age: 35
Discharge: LEFT BEFORE DISCHARGE | End: 2025-02-28
Attending: EMERGENCY MEDICINE
Payer: COMMERCIAL

## 2025-02-28 VITALS
WEIGHT: 230 LBS | SYSTOLIC BLOOD PRESSURE: 135 MMHG | BODY MASS INDEX: 36.96 KG/M2 | HEART RATE: 93 BPM | RESPIRATION RATE: 16 BRPM | OXYGEN SATURATION: 100 % | HEIGHT: 66 IN | TEMPERATURE: 98.7 F | DIASTOLIC BLOOD PRESSURE: 81 MMHG

## 2025-02-28 DIAGNOSIS — O20.0 THREATENED ABORTION: Primary | ICD-10-CM

## 2025-02-28 DIAGNOSIS — O20.9 VAGINAL BLEEDING AFFECTING EARLY PREGNANCY: ICD-10-CM

## 2025-02-28 DIAGNOSIS — O46.90 VAGINAL BLEEDING DURING PREGNANCY: Primary | ICD-10-CM

## 2025-02-28 LAB
B-HCG UR QL: POSITIVE
B-HCG UR QL: POSITIVE
BACTERIA UR QL AUTO: ABNORMAL /HPF
BASOPHILS # BLD AUTO: 0.03 10*3/MM3 (ref 0–0.2)
BASOPHILS NFR BLD AUTO: 0.4 % (ref 0–1.5)
BILIRUB UR QL STRIP: NEGATIVE
CLARITY UR: CLEAR
COLOR UR: YELLOW
DEPRECATED RDW RBC AUTO: 47.9 FL (ref 37–54)
EOSINOPHIL # BLD AUTO: 0.19 10*3/MM3 (ref 0–0.4)
EOSINOPHIL NFR BLD AUTO: 2.6 % (ref 0.3–6.2)
ERYTHROCYTE [DISTWIDTH] IN BLOOD BY AUTOMATED COUNT: 16.9 % (ref 12.3–15.4)
EXPIRATION DATE: ABNORMAL
EXPIRATION DATE: ABNORMAL
GLUCOSE UR STRIP-MCNC: NEGATIVE MG/DL
HCG INTACT+B SERPL-ACNC: NORMAL MIU/ML
HCT VFR BLD AUTO: 34.1 % (ref 34–46.6)
HGB BLD-MCNC: 11 G/DL (ref 12–15.9)
HGB UR QL STRIP.AUTO: ABNORMAL
HYALINE CASTS UR QL AUTO: ABNORMAL /LPF
IMM GRANULOCYTES # BLD AUTO: 0.02 10*3/MM3 (ref 0–0.05)
IMM GRANULOCYTES NFR BLD AUTO: 0.3 % (ref 0–0.5)
INTERNAL NEGATIVE CONTROL: NEGATIVE
INTERNAL NEGATIVE CONTROL: NEGATIVE
INTERNAL POSITIVE CONTROL: POSITIVE
INTERNAL POSITIVE CONTROL: POSITIVE
KETONES UR QL STRIP: ABNORMAL
LEUKOCYTE ESTERASE UR QL STRIP.AUTO: ABNORMAL
LYMPHOCYTES # BLD AUTO: 2.41 10*3/MM3 (ref 0.7–3.1)
LYMPHOCYTES NFR BLD AUTO: 33 % (ref 19.6–45.3)
Lab: ABNORMAL
Lab: ABNORMAL
MCH RBC QN AUTO: 24.9 PG (ref 26.6–33)
MCHC RBC AUTO-ENTMCNC: 32.3 G/DL (ref 31.5–35.7)
MCV RBC AUTO: 77.1 FL (ref 79–97)
MONOCYTES # BLD AUTO: 0.47 10*3/MM3 (ref 0.1–0.9)
MONOCYTES NFR BLD AUTO: 6.4 % (ref 5–12)
NEUTROPHILS NFR BLD AUTO: 4.19 10*3/MM3 (ref 1.7–7)
NEUTROPHILS NFR BLD AUTO: 57.3 % (ref 42.7–76)
NITRITE UR QL STRIP: NEGATIVE
NRBC BLD AUTO-RTO: 0 /100 WBC (ref 0–0.2)
PH UR STRIP.AUTO: 5.5 [PH] (ref 5–8)
PLATELET # BLD AUTO: 364 10*3/MM3 (ref 140–450)
PMV BLD AUTO: 10.1 FL (ref 6–12)
PROT UR QL STRIP: ABNORMAL
RBC # BLD AUTO: 4.42 10*6/MM3 (ref 3.77–5.28)
RBC # UR STRIP: ABNORMAL /HPF
REF LAB TEST METHOD: ABNORMAL
SP GR UR STRIP: 1.02 (ref 1–1.03)
SQUAMOUS #/AREA URNS HPF: ABNORMAL /HPF
UROBILINOGEN UR QL STRIP: ABNORMAL
WBC # UR STRIP: ABNORMAL /HPF
WBC NRBC COR # BLD AUTO: 7.31 10*3/MM3 (ref 3.4–10.8)

## 2025-02-28 PROCEDURE — 36415 COLL VENOUS BLD VENIPUNCTURE: CPT

## 2025-02-28 PROCEDURE — 99284 EMERGENCY DEPT VISIT MOD MDM: CPT

## 2025-02-28 PROCEDURE — 81001 URINALYSIS AUTO W/SCOPE: CPT | Performed by: EMERGENCY MEDICINE

## 2025-02-28 PROCEDURE — 87086 URINE CULTURE/COLONY COUNT: CPT | Performed by: EMERGENCY MEDICINE

## 2025-02-28 PROCEDURE — 81025 URINE PREGNANCY TEST: CPT | Performed by: EMERGENCY MEDICINE

## 2025-02-28 PROCEDURE — 85025 COMPLETE CBC W/AUTO DIFF WBC: CPT | Performed by: PHYSICIAN ASSISTANT

## 2025-02-28 PROCEDURE — 81025 URINE PREGNANCY TEST: CPT | Performed by: PHYSICIAN ASSISTANT

## 2025-02-28 PROCEDURE — 99283 EMERGENCY DEPT VISIT LOW MDM: CPT

## 2025-02-28 PROCEDURE — 84702 CHORIONIC GONADOTROPIN TEST: CPT | Performed by: PHYSICIAN ASSISTANT

## 2025-02-28 NOTE — ED PROVIDER NOTES
Subjective   History of Present Illness  34-year-old female presents emergency department today for heavy vaginal bleeding.  Patient reports he did a pregnancy test that was positive several days ago.  Over the past day or so she been having very heavy vaginal bleeding.  No abdominal cramping.  She reports that through the night she was having to put sheets between her legs due to the amount of bleeding that is since let up.  Abdominal cramping is resolved.  She has had no dysuria frequency urgency or hematuria.  No other complaints.  G2, P1 Ab0.    History provided by:  Patient   used: No    Vaginal Bleeding - Pregnant  Quality:  Dark red, clots and bright red  Severity:  Severe  Onset quality:  Sudden  Duration:  1 day  Timing:  Sporadic  Progression:  Resolved  Chronicity:  New  Prior pregnancy: yes    Pregnancy confirmed by ultrasound: no    Gestational age:  Unknown  Prenatal care:  No prenatal care  Context: spontaneously    Context: not after bowel movement    Relieved by:  Nothing  Worsened by:  Nothing  Ineffective treatments:  None tried  Associated symptoms: abdominal pain    Associated symptoms: no dysuria, no fatigue, no nausea and no vaginal discharge    Risk factors: no bleeding disorder, no hx of ectopic pregnancy, no ovarian cysts, no ovarian torsion, no PID, no STD exposure, no terminated pregnancy and no unprotected sex        Review of Systems   Constitutional:  Negative for fatigue.   Respiratory:  Negative for chest tightness, shortness of breath and wheezing.    Cardiovascular:  Negative for chest pain and palpitations.   Gastrointestinal:  Positive for abdominal pain. Negative for nausea.   Genitourinary:  Negative for dysuria, frequency, urgency and vaginal discharge.       No past medical history on file.    No Known Allergies    Past Surgical History:   Procedure Laterality Date     SECTION N/A 2/10/2017    Procedure:  SECTION PRIMARY;  Surgeon: Dion BETANCUR  MD Vladimir;  Location: [facility] LABOR DELIVERY;  Service:        Family History   Problem Relation Age of Onset    No Known Problems Father     Hypertension Mother     Hyperlipidemia Mother     Breast cancer Paternal Aunt        Social History     Socioeconomic History    Marital status: Single   Tobacco Use    Smoking status: Never    Smokeless tobacco: Never   Substance and Sexual Activity    Alcohol use: Yes     Comment: 3-5 drinks/week prior to pregnancy    Drug use: Yes     Frequency: 3.0 times per week     Types: Marijuana    Sexual activity: Yes     Partners: Male           Objective   Physical Exam  Vitals and nursing note reviewed.   Constitutional:       General: She is not in acute distress.     Appearance: She is well-developed. She is not diaphoretic.   HENT:      Head: Normocephalic and atraumatic.      Nose: Nose normal.   Eyes:      General: No scleral icterus.     Conjunctiva/sclera: Conjunctivae normal.   Cardiovascular:      Rate and Rhythm: Normal rate and regular rhythm.      Heart sounds: Normal heart sounds. No murmur heard.  Pulmonary:      Effort: Pulmonary effort is normal. No respiratory distress.      Breath sounds: Normal breath sounds.   Abdominal:      General: Bowel sounds are normal.      Palpations: Abdomen is soft.      Tenderness: There is no abdominal tenderness.   Musculoskeletal:         General: Normal range of motion.      Cervical back: Normal range of motion and neck supple.   Skin:     General: Skin is warm and dry.   Neurological:      Mental Status: She is alert and oriented to person, place, and time.   Psychiatric:         Behavior: Behavior normal.         Procedures           ED Course  ED Course as of 02/28/25 1325   Fri Feb 28, 2025   1325 Laboratory data patient's white count of 7.3 with an H&H of 11 and 34 and platelet count 364.  Urine hCG was positive quantitative hCG still pending.  The patient states she has to go  her children she is zulay leave AMA.   Ultrasound has not been done up to this point. [CHIO]      ED Course User Index  [CHIO] Braden Valle PA                                                       Medical Decision Making  Amount and/or Complexity of Data Reviewed  Labs: ordered.        Final diagnoses:   None       ED Disposition  ED Disposition       ED Disposition   AMA    Condition   --    Comment   Pt needs to leave to pick children up from school.                No follow-up provider specified.       Medication List      No changes were made to your prescriptions during this visit.            Braden Valle PA  03/03/25 3256

## 2025-03-01 ENCOUNTER — APPOINTMENT (OUTPATIENT)
Dept: ULTRASOUND IMAGING | Facility: HOSPITAL | Age: 35
End: 2025-03-01
Payer: COMMERCIAL

## 2025-03-01 VITALS
SYSTOLIC BLOOD PRESSURE: 119 MMHG | TEMPERATURE: 99 F | WEIGHT: 230 LBS | RESPIRATION RATE: 18 BRPM | BODY MASS INDEX: 38.32 KG/M2 | DIASTOLIC BLOOD PRESSURE: 72 MMHG | OXYGEN SATURATION: 100 % | HEIGHT: 65 IN | HEART RATE: 99 BPM

## 2025-03-01 PROCEDURE — 76817 TRANSVAGINAL US OBSTETRIC: CPT

## 2025-03-01 NOTE — ED PROVIDER NOTES
Subjective   History of Present Illness  This is a 34-year-old female who presented to the emergency department with some abdominal pain and vaginal bleeding.  Patient states that she has newly diagnosed with a pregnancy.  Her last menstrual cycle was in December, however she frequently has irregular menstrual cycles.  She had a positive pregnancy test at home.  She was seen earlier here today, however had to leave due to childcare issues.  She returns this evening because she is still having some minor bleeding.  Complaining of abdominal cramping.  Denies any trauma to the area.  No hematuria.  No other vaginal discharge.  No fevers or chills.  No headache or change in vision.  No focal weakness.  No chest pain or shortness of breath    History provided by:  Patient   used: No        Review of Systems   Constitutional:  Negative for chills and fever.   HENT:  Negative for congestion, ear pain and sore throat.    Eyes:  Negative for visual disturbance.   Respiratory:  Negative for shortness of breath.    Cardiovascular:  Negative for chest pain.   Gastrointestinal:  Positive for abdominal pain.   Genitourinary:  Positive for vaginal bleeding. Negative for difficulty urinating.   Musculoskeletal:  Negative for arthralgias.   Skin:  Negative for rash.   Neurological:  Negative for dizziness, weakness and numbness.   Psychiatric/Behavioral:  Negative for agitation.        No past medical history on file.    No Known Allergies    Past Surgical History:   Procedure Laterality Date     SECTION N/A 2/10/2017    Procedure:  SECTION PRIMARY;  Surgeon: Dion Gilbert MD;  Location: [facility] LABOR DELIVERY;  Service:        Family History   Problem Relation Age of Onset    No Known Problems Father     Hypertension Mother     Hyperlipidemia Mother     Breast cancer Paternal Aunt        Social History     Socioeconomic History    Marital status: Single   Tobacco Use    Smoking status: Never     Smokeless tobacco: Never   Substance and Sexual Activity    Alcohol use: Yes     Comment: 3-5 drinks/week prior to pregnancy    Drug use: Yes     Frequency: 3.0 times per week     Types: Marijuana    Sexual activity: Yes     Partners: Male           Objective   Physical Exam  Vitals and nursing note reviewed.   Constitutional:       General: She is not in acute distress.     Appearance: She is not ill-appearing or toxic-appearing.   Eyes:      Conjunctiva/sclera: Conjunctivae normal.      Pupils: Pupils are equal, round, and reactive to light.   Cardiovascular:      Rate and Rhythm: Normal rate and regular rhythm.   Pulmonary:      Effort: Pulmonary effort is normal. No respiratory distress.   Abdominal:      General: Abdomen is flat. There is no distension.      Palpations: There is no mass.      Tenderness: There is abdominal tenderness (Minimal generalized). There is no guarding or rebound.   Musculoskeletal:         General: No deformity. Normal range of motion.   Skin:     General: Skin is warm.      Findings: No rash.   Neurological:      General: No focal deficit present.      Mental Status: She is alert and oriented to person, place, and time.      Motor: No weakness.         Procedures           ED Course  ED Course as of 03/01/25 0327   Sat Mar 01, 2025   0011 BP: 123/92 [JK]   0011 Temp: 99 °F (37.2 °C) [JK]   0011 Temp src: Oral [JK]   0011 Heart Rate: 89 [JK]   0011 Resp: 18 [JK]   0011 SpO2: 99 % [JK]   0011 Device (Oxygen Therapy): room air  Interpretation:  Patient's repeat vitals, telemetry tracing, and pulse oximetry tracing were directly viewed and interpreted by myself.   O2 sat 99% on room air, interpreted as normal.  Telemetry rhythm strip revealed a rate of 89 bpm, interpreted as normal sinus rhythm [JK]   0011 Urinalysis With Culture If Indicated - Urine, Clean Catch(!) [JK]   0011 Urinalysis, Microscopic Only - Urine, Clean Catch(!)  Interpretation:  Laboratory studies were reviewed and  interpreted directly by myself.  Pregnancy was positive.  Urinalysis did not show any bacteria [JK]   0326 US Ob Transvaginal  Interpretation:  Imaging was directly visualized by myself, per my interpretations, transvaginal ultrasound revealed some pelvic fibroids, however no fetal pole or heart rate. [JK]   0326 Patient symptoms seem most consistent with a miscarriage.  However is difficult to determine given her presentation.  She will require follow-up in 48 hours for repeat hCG and possible ultrasound.  Patient is not having any bleeding at this time.  Repeat abdominal examination is benign.  Patient was given strict return precautions.  Verbalized understanding [JK]   0326 I had a discussion with the patient/family regarding diagnosis, diagnostic results, treatment plan, and medications. The patient/family indicated understanding of these instructions. I spent adequate time at the bedside prior to discharge necessary to discuss the aftercare instructions, giving patient education, providing explanations of the results of our evaluations/findings, and my decision making to assure that the patient/family understand the plan of care. Time was allotted to answer questions at that time and throughout the ED course. Patient is required to maintain timely follow up, as discussed. I also discussed the potential for the development of an acute emergent condition requiring further evaluation, return to the ER, admission, or even surgical intervention.  I encouraged the patient to return to the emergency department immediately for any concerns, worsening symptoms, new complaints, or if symptoms persist and they are unable to seek follow-up in a timely fashion. The patient/family expressed understanding and agreement with this plan    Shared decision making:   After full review of the patient's clinical presentation, review of any work-up including but not limited to laboratory studies and radiology obtained, I had a  discussion with the patient.  Treatment options were discussed as well as the risks, benefits and consequences.  I discussed all findings with the patient and family members if available.  During the discussion, treatment goals were understood by all as well as any misconceptions which were addressed with the patient.  Ample time was given for any questions they may have had.  They are in agreement with the treatment plan as well as final disposition. [JK]      ED Course User Index  [JK] Glynn Larkin MD                                                       Medical Decision Making  This is a 34-year-old female presenting with some abdominal cramping bleeding.  The patient was seen earlier for similar symptoms.  She did have hCG performed at that time, however had to leave prior to completing workup.  She returns with similar symptoms.  Patient has minimal tenderness on exam, however there is no evidence of acute abdomen or peritonitis.  Symptoms seem most consistent with acute miscarriage.  I did review her previous blood work and it seems she is blood type O+ and will not require RhoGAM.  Imaging will be obtained    Differential diagnosis: Miscarriage, threatened , vaginal bleeding in pregnancy, abdominal cramping, UTI, ectopic pregnancy    Amount and/or Complexity of Data Reviewed  External Data Reviewed: labs, radiology and notes.     Details: External laboratories, imaging as well as notes were reviewed personally by myself.  All relevant studies were used to guide decision making.     Date of previous record: 2/10/2017    Source of note: Admission Record    Summary:  Patient was seen and admitted for delivery.  I did review basic laboratory studies on file as well as a previous chest x-ray.  Records reviewed      Labs: ordered. Decision-making details documented in ED Course.  Radiology: ordered and independent interpretation performed. Decision-making details documented in ED  Course.        Final diagnoses:   Threatened    Vaginal bleeding affecting early pregnancy       ED Disposition  ED Disposition       ED Disposition   Discharge    Condition   Stable    Comment   --               Andie Ruiz MD  [address] [address]  [phone]    Call in 1 day           Medication List      No changes were made to your prescriptions during this visit.            Glynn Larkin MD  [phone]

## 2025-03-02 LAB — BACTERIA SPEC AEROBE CULT: NORMAL

## 2025-03-03 ENCOUNTER — TELEPHONE (OUTPATIENT)
Dept: OBSTETRICS AND GYNECOLOGY | Facility: CLINIC | Age: 35
End: 2025-03-03

## 2025-03-03 NOTE — TELEPHONE ENCOUNTER
MESSAGE FROM THE HUB     PATIENT HAS NOT SEEN [facility]  IN THE PAST. INCOMING  FOR DX: THREATENED . LMP 24. ED- 25. LABS- 25 PREG TEST, UR CX, MICRO UA, HCG, CBC W/DIFF. IMAGING- 3/1/25  OB JANET @ [facility]     HUB SENDING TO SCHEDULE REVIEW- PT SEEN IN ED BUT NOT SEEN BY OBGYN PROVIDER. PLEASE ADVISE ON SCHEDULING.

## 2025-03-06 ENCOUNTER — TELEPHONE (OUTPATIENT)
Dept: OBSTETRICS AND GYNECOLOGY | Facility: CLINIC | Age: 35
End: 2025-03-06
Payer: COMMERCIAL

## 2025-03-06 NOTE — TELEPHONE ENCOUNTER
Juvencio No referral from another office, a self referral. ER follow up must be scheduled by clinical

## 2025-03-06 NOTE — TELEPHONE ENCOUNTER
Hub staff attempted to follow warm transfer process and was unsuccessful     Caller: Enrique Moscoso    Relationship to patient: Self    Best call back number: [phone]     Patient is needing: PATIENT CALLED TO CHECK THE STATUS OF THE REFERRAL THAT WAS PLACED FOR HER ON 03/03/2025 FOR FOLLOW UP FROM ER ON POSSIBLE MISCARRIAGE.  PATIENT WOULD LIKE A CALL BACK WITH THE STATUS OF HER REFERRAL.

## 2025-03-06 NOTE — TELEPHONE ENCOUNTER
MESSAGE BELOW FROM THE HUB     PATIENT HAS NOT SEEN [facility]  IN THE PAST. INCOMING  FOR DX: THREATENED . LMP 24. ED- 25. LABS- 25 PREG TEST, UR CX, MICRO UA, HCG, CBC W/DIFF. IMAGING- 3/1/25  OB JANET @ [facility]     HUB SENDING TO SCHEDULE REVIEW- PT SEEN IN ED BUT NOT SEEN BY OBGYN PROVIDER. PLEASE ADVISE ON SCHEDULING.

## 2025-03-07 ENCOUNTER — OFFICE VISIT (OUTPATIENT)
Dept: OBSTETRICS AND GYNECOLOGY | Facility: CLINIC | Age: 35
End: 2025-03-07
Payer: COMMERCIAL

## 2025-03-07 ENCOUNTER — TELEPHONE (OUTPATIENT)
Dept: OBSTETRICS AND GYNECOLOGY | Facility: CLINIC | Age: 35
End: 2025-03-07

## 2025-03-07 VITALS
SYSTOLIC BLOOD PRESSURE: 122 MMHG | WEIGHT: 210 LBS | BODY MASS INDEX: 34.99 KG/M2 | DIASTOLIC BLOOD PRESSURE: 84 MMHG | HEIGHT: 65 IN

## 2025-03-07 DIAGNOSIS — O20.0 THREATENED ABORTION: ICD-10-CM

## 2025-03-07 DIAGNOSIS — D25.1 INTRAMURAL AND SUBSEROUS LEIOMYOMA OF UTERUS: Primary | ICD-10-CM

## 2025-03-07 DIAGNOSIS — D25.2 INTRAMURAL AND SUBSEROUS LEIOMYOMA OF UTERUS: Primary | ICD-10-CM

## 2025-03-07 DIAGNOSIS — O20.0 THREATENED ABORTION: Primary | ICD-10-CM

## 2025-03-07 PROBLEM — Z34.90 PREGNANCY: Status: ACTIVE | Noted: 2025-03-07

## 2025-03-07 NOTE — PROGRESS NOTES
"    Chief Complaint   Patient presents with    Follow-up     New pt seen in the ER 25 for TAB    MAB            HPI  Enrique Moscoso is a 34 y.o. female, .    She had a positive pregnancy test mid-Feb.  One week later, she had heavy bleeding, passing of tissue, and cramping.  She went to the ER.  US showed possible but unlikely GS and fibroids.  The bleeding and cramping are minimal today.  She denies fever, odor, abd pain, dysuria.  Since then she has passage of tissue.  Her past medical history is non-contributory. She reports no additional symptoms or complaints.    Recent Tests:  US today: yes  Rh Status: Positive      The additional following portions of the patient's history were reviewed and updated as appropriate: allergies, current medications, past family history, past medical history, past social history, past surgical history, and problem list.    Review of Systems   Constitutional: Negative.    HENT: Negative.     Eyes: Negative.    Respiratory: Negative.     Cardiovascular: Negative.    Gastrointestinal: Negative.    Endocrine: Negative.    Genitourinary: Negative.    Musculoskeletal: Negative.    Skin: Negative.    Allergic/Immunologic: Negative.    Neurological: Negative.    Hematological: Negative.    Psychiatric/Behavioral: Negative.           I have reviewed and agree with the HPI, ROS, and historical information as entered above. Christina Chavez, APRN      Objective   /84   Ht 165.1 cm (65\")   Wt 95.3 kg (210 lb)   LMP 2024 (Exact Date)   Breastfeeding No   BMI 34.95 kg/m²     Physical Exam  Vitals and nursing note reviewed.   Constitutional:       General: She is not in acute distress.     Appearance: Normal appearance. She is obese. She is not ill-appearing.   Pulmonary:      Effort: Pulmonary effort is normal. No respiratory distress.   Skin:     General: Skin is warm and dry.   Neurological:      Mental Status: She is alert and oriented to person, place, and time. "   Psychiatric:         Mood and Affect: Mood normal.         Behavior: Behavior normal.            Assessment and Plan    Problem List Items Addressed This Visit          Hematology and Neoplasia    Intramural and subserous leiomyoma of uterus - Primary    Overview     Ultrasound 3/7/2025 with 3 fibroids measured.  Largest 5 cm posterior lower uterine segment.          Other Visit Diagnoses       Threatened                 D/w pt US today shows no sign of IUP or adnexal mass.  Fibroids noted; she was unaware she had these.    STAT hcg now.  Added CBC, CMP in case of ectopic.  MBT +.    Return in about 3 months (around 2025) for US FU--fibroids.    Addendum:  hcg today is 305.  Notified pt of results.  Repeat weekly until negative.  Call prn severe pain, severe bleeding, fever, odor.  Repeat hcg in 3 mo to doc stable fibroids.        Christina Chavez, APRN  2025

## 2025-03-13 ENCOUNTER — RESULTS FOLLOW-UP (OUTPATIENT)
Dept: OBSTETRICS AND GYNECOLOGY | Facility: CLINIC | Age: 35
End: 2025-03-13
Payer: COMMERCIAL

## 2025-03-13 LAB
ALBUMIN SERPL-MCNC: 3.9 G/DL (ref 3.5–5.2)
ALBUMIN/GLOB SERPL: 1.4 G/DL
ALP SERPL-CCNC: 65 U/L (ref 39–117)
ALT SERPL-CCNC: 10 U/L (ref 1–33)
AST SERPL-CCNC: 11 U/L (ref 1–32)
BILIRUB SERPL-MCNC: <0.2 MG/DL (ref 0–1.2)
BUN SERPL-MCNC: 11 MG/DL (ref 6–20)
BUN/CREAT SERPL: 13.3 (ref 7–25)
CALCIUM SERPL-MCNC: 9.1 MG/DL (ref 8.6–10.5)
CHLORIDE SERPL-SCNC: 109 MMOL/L (ref 98–107)
CO2 SERPL-SCNC: 22 MMOL/L (ref 22–29)
CREAT SERPL-MCNC: 0.83 MG/DL (ref 0.57–1)
EGFRCR SERPLBLD CKD-EPI 2021: 94.4 ML/MIN/1.73
ERYTHROCYTE [DISTWIDTH] IN BLOOD BY AUTOMATED COUNT: 16.8 % (ref 12.3–15.4)
GLOBULIN SER CALC-MCNC: 2.7 GM/DL
GLUCOSE SERPL-MCNC: 98 MG/DL (ref 65–99)
HCG INTACT+B SERPL-ACNC: 305 MIU/ML
HCT VFR BLD AUTO: 29.3 % (ref 34–46.6)
HGB BLD-MCNC: 9.2 G/DL (ref 12–15.9)
MCH RBC QN AUTO: 24.6 PG (ref 26.6–33)
MCHC RBC AUTO-ENTMCNC: 31.4 G/DL (ref 31.5–35.7)
MCV RBC AUTO: 78.3 FL (ref 79–97)
PLATELET # BLD AUTO: 409 10E3/MM3 (ref 140–450)
POTASSIUM SERPL-SCNC: 4.7 MMOL/L (ref 3.5–5.2)
PROT SERPL-MCNC: 6.6 G/DL (ref 6–8.5)
RBC # BLD AUTO: 3.74 10E6/MM3 (ref 3.77–5.28)
SODIUM SERPL-SCNC: 144 MMOL/L (ref 136–145)
WBC # BLD AUTO: 6.05 10E3/MM3 (ref 3.4–10.8)

## (undated) DEVICE — 39" SINGLE PATIENT USE HOVERMATT: Brand: SINGLE PATIENT USE HOVERMATT

## (undated) DEVICE — SUT GUT CHRM 1 CTX 36IN 905H

## (undated) DEVICE — SUT PDS 0 CTX 36IN DYED Z370T

## (undated) DEVICE — TRY SPINE BLCK WHITACRE 25G 3X5IN

## (undated) DEVICE — SOL IRR H2O BTL 1000ML STRL

## (undated) DEVICE — PK C/SECT 10

## (undated) DEVICE — GLV SURG BIOGEL LTX PF 7 1/2

## (undated) DEVICE — SOL IRR NACL 0.9PCT BT 1000ML

## (undated) DEVICE — PROXIMATE RH ROTATING HEAD SKIN STAPLERS (35 WIDE) CONTAINS 35 STAINLESS STEEL STAPLES: Brand: PROXIMATE